# Patient Record
Sex: MALE | Race: WHITE | Employment: FULL TIME | URBAN - METROPOLITAN AREA
[De-identification: names, ages, dates, MRNs, and addresses within clinical notes are randomized per-mention and may not be internally consistent; named-entity substitution may affect disease eponyms.]

---

## 2017-01-06 ENCOUNTER — OFFICE VISIT (OUTPATIENT)
Dept: INTERNAL MEDICINE CLINIC | Age: 57
End: 2017-01-06

## 2017-01-06 VITALS
SYSTOLIC BLOOD PRESSURE: 133 MMHG | RESPIRATION RATE: 18 BRPM | OXYGEN SATURATION: 96 % | DIASTOLIC BLOOD PRESSURE: 89 MMHG | TEMPERATURE: 97.9 F | HEART RATE: 79 BPM | HEIGHT: 71 IN | BODY MASS INDEX: 28.7 KG/M2 | WEIGHT: 205 LBS

## 2017-01-06 DIAGNOSIS — Z12.11 SCREEN FOR COLON CANCER: ICD-10-CM

## 2017-01-06 DIAGNOSIS — R76.8 RHEUMATOID FACTOR POSITIVE: ICD-10-CM

## 2017-01-06 DIAGNOSIS — I10 ESSENTIAL HYPERTENSION: Primary | ICD-10-CM

## 2017-01-06 DIAGNOSIS — R73.9 HYPERGLYCEMIA: ICD-10-CM

## 2017-01-06 DIAGNOSIS — Z12.5 SCREENING FOR PROSTATE CANCER: ICD-10-CM

## 2017-01-06 DIAGNOSIS — Z23 ENCOUNTER FOR IMMUNIZATION: ICD-10-CM

## 2017-01-06 DIAGNOSIS — E78.00 HYPERCHOLESTEROLEMIA: ICD-10-CM

## 2017-01-06 NOTE — MR AVS SNAPSHOT
Visit Information Date & Time Provider Department Dept. Phone Encounter #  
 1/6/2017  8:45 AM Alfred Mccarthy, [address] [id number] Follow-up Instructions Return in about 6 months (around 7/6/2017) for HTN. Upcoming Health Maintenance Date Due COLONOSCOPY 7/17/1980 DTaP/Tdap/Td series (1 - Tdap) 10/2/2008 INFLUENZA AGE 9 TO ADULT 8/1/2016 Allergies as of 1/6/2017  Review Complete On: 1/6/2017 By: Alfred Mccarthy MD  
  
 Severity Noted Reaction Type Reactions Coricidin [Phenylephrine Hcl]  05/14/2013    Hives Losartan  06/29/2015    Other (comments) Arthralgias Current Immunizations  Reviewed on 5/14/2013 Name Date Hep A Vaccine 9/12/2011 Hep B Vaccine 9/12/2011 Influenza Vaccine 10/1/2015, 10/1/2014 Influenza Vaccine (Quad) PF  Incomplete Influenza Vaccine PF 10/11/2013 MMR 8/8/2010 Td 10/1/2008 Not reviewed this visit You Were Diagnosed With   
  
 Codes Comments Essential hypertension    -  Primary ICD-10-CM: I10 
ICD-9-CM: 401.9 Hypercholesterolemia     ICD-10-CM: E78.00 ICD-9-CM: 272.0 Rheumatoid factor positive     ICD-10-CM: R76.8 ICD-9-CM: 795.79 Screening for prostate cancer     ICD-10-CM: Z12.5 ICD-9-CM: V76.44 Hyperglycemia     ICD-10-CM: R73.9 ICD-9-CM: 790.29 Screen for colon cancer     ICD-10-CM: Z12.11 ICD-9-CM: V76.51 Encounter for immunization     ICD-10-CM: E64 ICD-9-CM: V03.89 Vitals BP Pulse Temp Resp Height(growth percentile) Weight(growth percentile) 133/89 (BP 1 Location: Left arm, BP Patient Position: Sitting) 79 97.9 °F (36.6 °C) (Oral) 18 5' 11\" (1.803 m) 205 lb (93 kg) SpO2 BMI Smoking Status 96% 28.59 kg/m2 Never Smoker Vitals History BMI and BSA Data Body Mass Index Body Surface Area 28.59 kg/m 2 2.16 m 2 Preferred Pharmacy Pharmacy Name Phone [address]. [phone] Your Updated Medication List  
  
   
This list is accurate as of: 1/6/17  9:29 AM.  Always use your most recent med list. amLODIPine 10 mg tablet Commonly known as:  Elspenidia Bakes Take 1 Tab by mouth daily. FISH OIL 1,000 mg Cap Generic drug:  omega-3 fatty acids-vitamin e Take 1 Cap by mouth.  
  
 multivitamin tablet Commonly known as:  ONE A DAY Take 1 Tab by mouth daily. NASACORT NA  
by Nasal route. ZYRTEC PO Take  by mouth. We Performed the Following CBC WITH AUTOMATED DIFF [45599 CPT(R)] HEMOGLOBIN A1C WITH EAG [64693 CPT(R)] INFLUENZA VIRUS VAC QUAD,SPLIT,PRESV FREE SYRINGE 3/> YRS IM [id number] CPT(R)] LIPID PANEL [25447 CPT(R)] METABOLIC PANEL, COMPREHENSIVE [75978 CPT(R)] PSA DIAGNOSTIC (PROSTATIC SPECIFIC AG) [id number] CPT(R)] REFERRAL TO GASTROENTEROLOGY [QIQ03 Custom] Follow-up Instructions Return in about 6 months (around 7/6/2017) for HTN. Referral Information Referral ID Referred By Referred To  
  
 [id number] Cristina RODRIGUES MD   
   [address] [place] [address] [address] Phone: [phone] Fax: [phone] Visits Status Start Date End Date 1 New Request 1/6/17 1/6/18 If your referral has a status of pending review or denied, additional information will be sent to support the outcome of this decision. Introducing [facility] & HEALTH SERVICES! Hieu Peralta introduces Big Super Search patient portal. Now you can access parts of your medical record, email your doctor's office, and request medication refills online. 1. In your internet browser, go to [email or web address] 2. Click on the First Time User? Click Here link in the Sign In box. You will see the New Member Sign Up page. 3. Enter your Big Super Search Access Code exactly as it appears below.  You will not need to use this code after youve completed the sign-up process. If you do not sign up before the expiration date, you must request a new code. · Profitably Access Code: [email or web address] Expires: 4/6/2017  9:29 AM 
 
4. Enter the last four digits of your Social Security Number (xxxx) and Date of Birth (mm/dd/yyyy) as indicated and click Submit. You will be taken to the next sign-up page. 5. Create a Profitably ID. This will be your Profitably login ID and cannot be changed, so think of one that is secure and easy to remember. 6. Create a Profitably password. You can change your password at any time. 7. Enter your Password Reset Question and Answer. This can be used at a later time if you forget your password. 8. Enter your e-mail address. You will receive e-mail notification when new information is available in [address]. 9. Click Sign Up. You can now view and download portions of your medical record. 10. Click the Download Summary menu link to download a portable copy of your medical information. If you have questions, please visit the Frequently Asked Questions section of the Profitably website. Remember, Profitably is NOT to be used for urgent needs. For medical emergencies, dial 911. Now available from your iPhone and Android! Please provide this summary of care documentation to your next provider. Your primary care clinician is listed as [place]. If you have any questions after today's visit, please call [phone].

## 2017-01-06 NOTE — PROGRESS NOTES
SUBJECTIVE:   Mr. Milena Pickering is a 49 y.o. male who is here for follow up of routine medical issues. Previously saw Dr. Brady Sierra. Chief Complaint   Patient presents with    Hypertension    Follow-up     6 month f.u    Other     pt refused flu shot    Back Pain     pt c/o lower back pain        Generally has BP runs 125-135 / 85. Allergies currently quiescent. He has a history of positive rheumatoid factor. He is starting to have a little joint pain and fatigue. Morning stiffness now noticed, lasting 45-60 min. As we noted in 2014: He recalls that when he was started on losartan, he developed a lot of joint pains, fatigue, and loss of exercise tolerance. \"I stopped taking it and the aches would subside. \" \"Now I'm not quite back to where I was before, but pretty near. \"   At this time, he is otherwise doing well and has brought no other complaints to my attention today. For a list of the medical issues addressed today, see the assessment and plan below. PMH:   Past Medical History   Diagnosis Date    Hx of seasonal allergies     Hypercholesterolemia 10/14/2013    Hypertension        Past Surgical History   Procedure Laterality Date    Hx orthopaedic  1989     Back Surgery     Hx wisdom teeth extraction         All: He is allergic to coricidin [phenylephrine hcl] and losartan. Current Outpatient Prescriptions   Medication Sig    amLODIPine (NORVASC) 10 mg tablet Take 1 Tab by mouth daily.  CETIRIZINE HCL (ZYRTEC PO) Take  by mouth.  multivitamin (ONE A DAY) tablet Take 1 Tab by mouth daily.  TRIAMCINOLONE ACETONIDE (NASACORT NA) by Nasal route.  omega-3 fatty acids-vitamin e (FISH OIL) 1,000 mg cap Take 1 Cap by mouth. No current facility-administered medications for this visit. FH: His family history includes Cancer in his father; Hypertension in his mother.   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 SH: He is an  with [employer]. He  reports that he has never smoked. He has never used smokeless tobacco. He reports that he drinks about 1.0 oz of alcohol per week  He reports that he does not use illicit drugs. ROS: See above; Complete ROS otherwise negative. OBJECTIVE:   Vitals:   Visit Vitals    /89 (BP 1 Location: Left arm, BP Patient Position: Sitting)    Pulse 79    Temp 97.9 °F (36.6 °C) (Oral)    Resp 18    Ht 5' 11\" (1.803 m)    Wt 205 lb (93 kg)    SpO2 96%    BMI 28.59 kg/m2      Gen: Pleasant 49 y.o.  male in NAD. HEENT: PERRLA. EOMI. OP moist and pink. Neck: Supple. No LAD. HEART: RRR, No M/G/R.    LUNGS: CTAB No W/R. ABDOMEN: S, NT, ND, BS+. EXTREMITIES: Warm. No C/C/E.  MUSCULOSKELETAL: Normal ROM, muscle strength 5/5 all groups. NEURO: Alert and oriented x 3. Cranial nerves grossly intact. No focal sensory or motor deficits noted. SKIN: Warm. Dry. No rashes or other lesions noted. Lab Results   Component Value Date/Time    Sodium 139 12/11/2015 10:16 AM    Potassium 4.8 12/11/2015 10:16 AM    Chloride 102 12/11/2015 10:16 AM    CO2 23 12/11/2015 10:16 AM    Glucose 98 12/11/2015 10:16 AM    BUN 15 12/11/2015 10:16 AM    Creatinine 1.38 12/11/2015 10:16 AM    BUN/Creatinine ratio 11 12/11/2015 10:16 AM    GFR est AA 67 12/11/2015 10:16 AM    GFR est non-AA 58 12/11/2015 10:16 AM    Calcium 9.6 12/11/2015 10:16 AM    Bilirubin, total 0.4 12/11/2015 10:16 AM    ALT 34 12/11/2015 10:16 AM    AST 24 12/11/2015 10:16 AM    Alk.  phosphatase 68 12/11/2015 10:16 AM    Protein, total 7.0 12/11/2015 10:16 AM    Albumin 4.3 12/11/2015 10:16 AM    A-G Ratio 1.6 12/11/2015 10:16 AM       Lab Results   Component Value Date/Time    Cholesterol, total 175 12/11/2015 10:16 AM    HDL Cholesterol 53 12/11/2015 10:16 AM    LDL, calculated 108 12/11/2015 10:16 AM    Triglyceride 69 12/11/2015 10:16 AM        Lab Results Component Value Date/Time    Hemoglobin A1c 5.9 06/13/2014 09:10 AM       Lab Results   Component Value Date/Time    WBC 5.3 12/11/2015 10:16 AM    HGB 16.9 12/11/2015 10:16 AM    HCT 49.0 12/11/2015 10:16 AM    PLATELET 287 72/12/3957 10:16 AM    MCV 90 12/11/2015 10:16 AM         ASSESSMENT/ PLAN: Mirtha Robins was seen today for follow up. 1. HTN (hypertension): High normal today. - METABOLIC PANEL, COMPREHENSIVE  - CBC WITH AUTOMATED DIFF  2. Hypercholesterolemia: Recheck. 3. Probable RA: Rheumatoid factor positive. Prev referred to Dr. Kamini Sage. 4. Allergic rhinitis: Improved. 5. Overweight: Continue work on diet and exericse. Follow-up Disposition:  Return in about 6 months (around 7/6/2017) for HTN. I have reviewed the patient's medications and risks/side effects/benefits were discussed. Diagnosis(-es) explained to patient and questions answered. Literature provided where appropriate.

## 2017-01-06 NOTE — PROGRESS NOTES
Immunization/s administered 1/6/2017 by Rich Coleman LPN with patient's consent. Patient tolerated procedure well. No reactions noted.

## 2017-01-06 NOTE — PROGRESS NOTES
1. Have you been to the ER, urgent care clinic since your last visit? Hospitalized since your last visit?no    2. Have you seen or consulted any other health care providers outside of the [address] [place] [address] since your last visit? Include any pap smears or colon screening.  no

## 2017-01-07 LAB
ALBUMIN SERPL-MCNC: 4.5 G/DL (ref 3.5–5.5)
ALBUMIN/GLOB SERPL: 1.6 {RATIO} (ref 1.1–2.5)
ALP SERPL-CCNC: 74 IU/L (ref 39–117)
ALT SERPL-CCNC: 26 IU/L (ref 0–44)
AST SERPL-CCNC: 23 IU/L (ref 0–40)
BASOPHILS # BLD AUTO: 0.1 X10E3/UL (ref 0–0.2)
BASOPHILS NFR BLD AUTO: 1 %
BILIRUB SERPL-MCNC: 0.4 MG/DL (ref 0–1.2)
BUN SERPL-MCNC: 13 MG/DL (ref 6–24)
BUN/CREAT SERPL: 10 (ref 9–20)
CALCIUM SERPL-MCNC: 9.6 MG/DL (ref 8.7–10.2)
CHLORIDE SERPL-SCNC: 102 MMOL/L (ref 96–106)
CHOLEST SERPL-MCNC: 178 MG/DL (ref 100–199)
CO2 SERPL-SCNC: 23 MMOL/L (ref 18–29)
CREAT SERPL-MCNC: 1.28 MG/DL (ref 0.76–1.27)
EOSINOPHIL # BLD AUTO: 0.3 X10E3/UL (ref 0–0.4)
EOSINOPHIL NFR BLD AUTO: 5 %
ERYTHROCYTE [DISTWIDTH] IN BLOOD BY AUTOMATED COUNT: 14 % (ref 12.3–15.4)
EST. AVERAGE GLUCOSE BLD GHB EST-MCNC: 120 MG/DL
GLOBULIN SER CALC-MCNC: 2.8 G/DL (ref 1.5–4.5)
GLUCOSE SERPL-MCNC: 99 MG/DL (ref 65–99)
HBA1C MFR BLD: 5.8 % (ref 4.8–5.6)
HCT VFR BLD AUTO: 50 % (ref 37.5–51)
HDLC SERPL-MCNC: 57 MG/DL
HGB BLD-MCNC: 17.2 G/DL (ref 12.6–17.7)
IMM GRANULOCYTES # BLD: 0 X10E3/UL (ref 0–0.1)
IMM GRANULOCYTES NFR BLD: 0 %
LDLC SERPL CALC-MCNC: 105 MG/DL (ref 0–99)
LYMPHOCYTES # BLD AUTO: 1.4 X10E3/UL (ref 0.7–3.1)
LYMPHOCYTES NFR BLD AUTO: 26 %
MCH RBC QN AUTO: 30.6 PG (ref 26.6–33)
MCHC RBC AUTO-ENTMCNC: 34.4 G/DL (ref 31.5–35.7)
MCV RBC AUTO: 89 FL (ref 79–97)
MONOCYTES # BLD AUTO: 0.7 X10E3/UL (ref 0.1–0.9)
MONOCYTES NFR BLD AUTO: 13 %
NEUTROPHILS # BLD AUTO: 3 X10E3/UL (ref 1.4–7)
NEUTROPHILS NFR BLD AUTO: 55 %
PLATELET # BLD AUTO: 223 X10E3/UL (ref 150–379)
POTASSIUM SERPL-SCNC: 4.7 MMOL/L (ref 3.5–5.2)
PROT SERPL-MCNC: 7.3 G/DL (ref 6–8.5)
PSA SERPL-MCNC: 0.6 NG/ML (ref 0–4)
RBC # BLD AUTO: 5.62 X10E6/UL (ref 4.14–5.8)
SODIUM SERPL-SCNC: 143 MMOL/L (ref 134–144)
TRIGL SERPL-MCNC: 82 MG/DL (ref 0–149)
VLDLC SERPL CALC-MCNC: 16 MG/DL (ref 5–40)
WBC # BLD AUTO: 5.5 X10E3/UL (ref 3.4–10.8)

## 2017-01-10 ENCOUNTER — TELEPHONE (OUTPATIENT)
Dept: INTERNAL MEDICINE CLINIC | Age: 57
End: 2017-01-10

## 2017-08-02 ENCOUNTER — OFFICE VISIT (OUTPATIENT)
Dept: INTERNAL MEDICINE CLINIC | Age: 57
End: 2017-08-02

## 2017-08-02 VITALS
HEIGHT: 71 IN | DIASTOLIC BLOOD PRESSURE: 76 MMHG | WEIGHT: 210 LBS | HEART RATE: 94 BPM | OXYGEN SATURATION: 94 % | TEMPERATURE: 98.3 F | BODY MASS INDEX: 29.4 KG/M2 | SYSTOLIC BLOOD PRESSURE: 123 MMHG | RESPIRATION RATE: 20 BRPM

## 2017-08-02 DIAGNOSIS — E78.00 HYPERCHOLESTEROLEMIA: ICD-10-CM

## 2017-08-02 DIAGNOSIS — I10 ESSENTIAL HYPERTENSION: ICD-10-CM

## 2017-08-02 DIAGNOSIS — Z12.11 SCREEN FOR COLON CANCER: ICD-10-CM

## 2017-08-02 DIAGNOSIS — R76.8 RHEUMATOID FACTOR POSITIVE: Primary | ICD-10-CM

## 2017-08-02 DIAGNOSIS — R73.9 BORDERLINE HYPERGLYCEMIA: ICD-10-CM

## 2017-08-02 RX ORDER — AMLODIPINE BESYLATE 10 MG/1
10 TABLET ORAL DAILY
Qty: 90 TAB | Refills: 3 | Status: SHIPPED | OUTPATIENT
Start: 2017-08-02 | End: 2018-08-06 | Stop reason: SDUPTHER

## 2017-08-02 NOTE — MR AVS SNAPSHOT
Visit Information Date & Time Provider Department Dept. Phone Encounter #  
 8/2/2017  3:30 PM Selvin Morris, [address] [place] [address],[address] [phone] [id number] Follow-up Instructions Return in about 6 months (around 2/2/2018) for HTN. Upcoming Health Maintenance Date Due COLONOSCOPY 7/17/1980 DTaP/Tdap/Td series (1 - Tdap) 10/2/2008 INFLUENZA AGE 9 TO ADULT 8/1/2017 Allergies as of 8/2/2017  Review Complete On: 8/2/2017 By: Selvin Morris MD  
  
 Severity Noted Reaction Type Reactions Coricidin [Phenylephrine Hcl]  05/14/2013    Hives Losartan  06/29/2015    Other (comments) Arthralgias Current Immunizations  Reviewed on 5/14/2013 Name Date Hep A Vaccine 9/12/2011 Hep B Vaccine 9/12/2011 Influenza Vaccine 10/1/2015, 10/1/2014 Influenza Vaccine (Quad) PF 1/6/2017 Influenza Vaccine PF 10/11/2013 MMR 8/8/2010 Td 10/1/2008 Not reviewed this visit You Were Diagnosed With   
  
 Codes Comments Rheumatoid factor positive    -  Primary ICD-10-CM: R76.8 ICD-9-CM: 795.79 Screen for colon cancer     ICD-10-CM: Z12.11 ICD-9-CM: V76.51 Essential hypertension     ICD-10-CM: I10 
ICD-9-CM: 401.9 Hypercholesterolemia     ICD-10-CM: E78.00 ICD-9-CM: 272.0 Borderline hyperglycemia     ICD-10-CM: R73.9 ICD-9-CM: 790.29 Vitals BP Pulse Temp Resp Height(growth percentile) Weight(growth percentile) 123/76 (BP 1 Location: Left arm, BP Patient Position: Sitting) 94 98.3 °F (36.8 °C) (Oral) 20 5' 11\" (1.803 m) 210 lb (95.3 kg) SpO2 BMI Smoking Status 94% 29.29 kg/m2 Never Smoker Vitals History BMI and BSA Data Body Mass Index Body Surface Area  
 29.29 kg/m 2 2.18 m 2 Preferred Pharmacy Pharmacy Name Phone [place] [address] [address]. [phone] Your Updated Medication List  
  
   
 This list is accurate as of: 8/2/17  4:27 PM.  Always use your most recent med list. amLODIPine 10 mg tablet Commonly known as:  Orval Kari Take 1 Tab by mouth daily. FISH OIL 1,000 mg Cap Generic drug:  omega-3 fatty acids-vitamin e Take 1 Cap by mouth.  
  
 multivitamin tablet Commonly known as:  ONE A DAY Take 1 Tab by mouth daily. NASACORT NA  
by Nasal route. ZYRTEC PO Take  by mouth. Prescriptions Printed Refills  
 amLODIPine (NORVASC) 10 mg tablet 3 Sig: Take 1 Tab by mouth daily. Class: Print Route: Oral  
  
We Performed the Following CBC WITH AUTOMATED DIFF [92920 CPT(R)] [address], IGG [id number] CPT(R)] HEMOGLOBIN A1C WITH EAG [42157 CPT(R)] LIPID PANEL [75985 CPT(R)] METABOLIC PANEL, COMPREHENSIVE [29410 CPT(R)] REFERRAL TO GASTROENTEROLOGY [AMD16 Custom] REFERRAL TO RHEUMATOLOGY [IAG62 Custom] RHEUMATOID FACTOR, QL [id number] CPT(R)] SED RATE (ESR) L571500 CPT(R)] Follow-up Instructions Return in about 6 months (around 2/2/2018) for HTN. Referral Information Referral ID Referred By Referred To  
  
 [id number] RAVI [address] Pamela Núñez MD   
   [address] [facility], [address] Phone: [phone] Fax: [phone] Visits Status Start Date End Date 1 New Request 8/2/17 8/2/18 If your referral has a status of pending review or denied, additional information will be sent to support the outcome of this decision. Referral ID Referred By Referred To  
 [id number] [facility] 200 [facility]  
   305 [facility] 230 [facility] 200 [facility] Visits Status Start Date End Date 1 New Request 8/2/17 8/2/18 If your referral has a status of pending review or denied, additional information will be sent to support the outcome of this decision. Patient Instructions Learning About Cutting Calories How do calories affect your weight? Food gives your body energy. Energy from the food you eat is measured in calories. This energy keeps your heart beating, your brain active, and your muscles working. Your body needs a certain number of calories each day. After your body uses the calories it needs, it stores extra calories as fat. To lose weight safely, you have to eat fewer calories while eating in a healthy way. How many calories do you need each day? The more active you are, the more calories you need. When you are less active, you need fewer calories. How many calories you need each day also depends on several things, including your age and whether you are male or female. Here are some general guidelines for adults: 
· Less active women and older adults need 1,600 to 2,000 calories each day. · Active women and less active men need 2,000 to 2,400 calories each day. · Active men need 2,400 to 3,000 calories each day. How can you cut calories and eat healthy meals? Whole grains, vegetables and fruits, and dried beans are good lower-calorie foods. They give you lots of nutrients and fiber. And they fill you up. Sweets, energy drinks, and soda pop are high in calories. They give you few nutrients and no fiber. Try to limit soda pop, fruit juice, and energy drinks. Drink water instead. Some fats can be part of a healthy diet. But cutting back on fats from highly processed foods like fast foods and many snack foods is a good way to lower the calories in your diet. Also, use smaller amounts of fats like butter, margarine, salad dressing, and mayonnaise. Add fresh garlic, lemon, or herbs to your meals to add flavor without adding fat. Meats and dairy products can be a big source of hidden fats. Try to choose lean or low-fat versions of these products.  
Fat-free cookies, candies, chips, and frozen treats can still be high in sugar and calories. Some fat-free foods have more calories than regular ones. Eat fat-free treats in moderation, as you would other foods. If your favorite foods are high in fat, salt, sugar, or calories, limit how often you eat them. Eat smaller servings, or look for healthy substitutes. Fill up on fruits, vegetables, and whole grains. Eating at home · Use meat as a side dish instead of as the main part of your meal. 
· Try main dishes that use whole wheat pasta, brown rice, dried beans, or vegetables. · Find ways to cook with little or no fat, such as broiling, steaming, or grilling. · Use cooking spray instead of oil. If you use oil, use a monounsaturated oil, such as canola or olive oil. · Trim fat from meats before you cook them. · Drain off fat after you brown the meat or while you roast it. · Chill soups and stews after you cook them. Then skim the fat off the top after it hardens. Eating out · Order foods that are broiled or poached rather than fried or breaded. · Cut back on the amount of butter or margarine that you use on bread. · Order sauces, gravies, and salad dressings on the side, and use only a little. · When you order pasta, choose tomato sauce rather than cream sauce. · Ask for salsa with your baked potato instead of sour cream, butter, cheese, or hercules. · Order meals in a small size instead of upgrading to a large. · Share an entree, or take part of your food home to eat as another meal. 
· Share appetizers and desserts. Where can you learn more? Go to http://jessica-ro.info/. Enter 99 [id number] in the search box to learn more about \"Learning About Cutting Calories. \" Current as of: November 9, 2016 Content Version: 11.3 © 6312-9903 [facility]. Care instructions adapted under license by [employer] (which disclaims liability or warranty for this information).  If you have questions about a medical condition or this instruction, always ask your healthcare professional. [place] [address] any warranty or liability for your use of this information. Introducing hospitals & HEALTH SERVICES! Dennis Morton introduces BrandProject patient portal. Now you can access parts of your medical record, email your doctor's office, and request medication refills online. 1. In your internet browser, go to [email or web address] 2. Click on the First Time User? Click Here link in the Sign In box. You will see the New Member Sign Up page. 3. Enter your BrandProject Access Code exactly as it appears below. You will not need to use this code after youve completed the sign-up process. If you do not sign up before the expiration date, you must request a new code. · BrandProject Access Code: [email or web address] Expires: 10/31/2017  4:11 PM 
 
4. Enter the last four digits of your Social Security Number (xxxx) and Date of Birth (mm/dd/yyyy) as indicated and click Submit. You will be taken to the next sign-up page. 5. Create a BrandProject ID. This will be your BrandProject login ID and cannot be changed, so think of one that is secure and easy to remember. 6. Create a BrandProject password. You can change your password at any time. 7. Enter your Password Reset Question and Answer. This can be used at a later time if you forget your password. 8. Enter your e-mail address. You will receive e-mail notification when new information is available in [address] 9. Click Sign Up. You can now view and download portions of your medical record. 10. Click the Download Summary menu link to download a portable copy of your medical information. If you have questions, please visit the Frequently Asked Questions section of the BrandProject website. Remember, BrandProject is NOT to be used for urgent needs. For medical emergencies, dial 911. Now available from your iPhone and Android! Please provide this summary of care documentation to your next provider. Your primary care clinician is listed as [place]. If you have any questions after today's visit, please call [phone].

## 2017-08-02 NOTE — PROGRESS NOTES
SUBJECTIVE:   Mr. Jack Patrick is a 56 y.o. male who is here for follow up of routine medical issues. Previously saw Dr. Sloan Guardado. 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 \"My arthritis is worse. Christiano in the morning. \"  Some burning numb feelings in hands. He has a history of positive rheumatoid factor. He is starting to have a little joint pain and fatigue. Morning stiffness now noticed, lasting 45-60 min. As we noted in 2014: He recalls that when he was started on losartan, he developed a lot of joint pains, fatigue, and loss of exercise tolerance. \"I stopped taking it and the aches would subside. \" \"Now I'm not quite back to where I was before, but pretty near. \"   Generally has BP runs 125-135 / 85. Allergies currently quiescent. At this time, he is otherwise doing well and has brought no other complaints to my attention today. For a list of the medical issues addressed today, see the assessment and plan below. PMH:   Past Medical History:   Diagnosis Date    Hx of seasonal allergies     Hypercholesterolemia 10/14/2013    Hypertension        Past Surgical History:   Procedure Laterality Date    HX ORTHOPAEDIC  1989    Back Surgery     HX WISDOM TEETH EXTRACTION         All: He is allergic to coricidin [phenylephrine hcl] and losartan. Current Outpatient Prescriptions   Medication Sig    amLODIPine (NORVASC) 10 mg tablet Take 1 Tab by mouth daily.  CETIRIZINE HCL (ZYRTEC PO) Take  by mouth.  multivitamin (ONE A DAY) tablet Take 1 Tab by mouth daily.  TRIAMCINOLONE ACETONIDE (NASACORT NA) by Nasal route.  omega-3 fatty acids-vitamin e (FISH OIL) 1,000 mg cap Take 1 Cap by mouth. No current facility-administered medications for this visit. FH: His family history includes Cancer in his father; Hypertension in his mother.   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 SH: He is an  with [address] He  reports that he has never smoked. He has never used smokeless tobacco. He reports that he drinks about 1.0 oz of alcohol per week  He reports that he does not use illicit drugs. ROS: See above; Complete ROS otherwise negative. OBJECTIVE:   Vitals:   Visit Vitals    /76 (BP 1 Location: Left arm, BP Patient Position: Sitting)    Pulse 94    Temp 98.3 °F (36.8 °C) (Oral)    Resp 20    Ht 5' 11\" (1.803 m)    Wt 210 lb (95.3 kg)    SpO2 94%    BMI 29.29 kg/m2      Gen: Pleasant 56 y.o.  male in NAD. HEENT: PERRLA. EOMI. OP moist and pink. Neck: Supple. No LAD. HEART: RRR, No M/G/R.    LUNGS: CTAB No W/R. ABDOMEN: S, NT, ND, BS+. EXTREMITIES: Warm. No C/C/E.  MUSCULOSKELETAL: Normal ROM, muscle strength 5/5 all groups. NEURO: Alert and oriented x 3. Cranial nerves grossly intact. No focal sensory or motor deficits noted. SKIN: Warm. Dry. No rashes or other lesions noted. Lab Results   Component Value Date/Time    Sodium 143 01/06/2017 09:52 AM    Potassium 4.7 01/06/2017 09:52 AM    Chloride 102 01/06/2017 09:52 AM    CO2 23 01/06/2017 09:52 AM    Glucose 99 01/06/2017 09:52 AM    BUN 13 01/06/2017 09:52 AM    Creatinine 1.28 01/06/2017 09:52 AM    BUN/Creatinine ratio 10 01/06/2017 09:52 AM    GFR est AA 73 01/06/2017 09:52 AM    GFR est non-AA 63 01/06/2017 09:52 AM    Calcium 9.6 01/06/2017 09:52 AM    Bilirubin, total 0.4 01/06/2017 09:52 AM    ALT (SGPT) 26 01/06/2017 09:52 AM    AST (SGOT) 23 01/06/2017 09:52 AM    Alk.  phosphatase 74 01/06/2017 09:52 AM    Protein, total 7.3 01/06/2017 09:52 AM    Albumin 4.5 01/06/2017 09:52 AM    A-G Ratio 1.6 01/06/2017 09:52 AM       Lab Results   Component Value Date/Time    Cholesterol, total 178 01/06/2017 09:52 AM    HDL Cholesterol 57 01/06/2017 09:52 AM    LDL, calculated 105 01/06/2017 09:52 AM    Triglyceride 82 01/06/2017 09:52 AM        Lab Results   Component Value Date/Time    Hemoglobin A1c 5.8 01/06/2017 09:52 AM       Lab Results   Component Value Date/Time    WBC 5.5 01/06/2017 09:52 AM    HGB 17.2 01/06/2017 09:52 AM    HCT 50.0 01/06/2017 09:52 AM    PLATELET 609 85/57/8720 09:52 AM    MCV 89 01/06/2017 09:52 AM         ASSESSMENT/ PLAN: Gustavo Iglesias was seen today for follow up. 1. HTN (hypertension): High normal today. - METABOLIC PANEL, COMPREHENSIVE  - CBC WITH AUTOMATED DIFF  2. Hypercholesterolemia: Recheck. 3. Probable RA: Rheumatoid factor positive. Prev referred to Dr. Nickie Black. 4. Allergic rhinitis: Improved. 5. Overweight: Continue work on diet and exericse. 6. Borderline hyperglycemia: Check A1C. Follow-up Disposition:  Return in about 6 months (around 2/2/2018) for HTN. I have reviewed the patient's medications and risks/side effects/benefits were discussed. Diagnosis(-es) explained to patient and questions answered. Literature provided where appropriate.

## 2017-08-02 NOTE — PATIENT INSTRUCTIONS

## 2017-08-02 NOTE — PROGRESS NOTES
1. Have you been to the ER, urgent care clinic since your last visit? Hospitalized since your last visit?no    2. Have you seen or consulted any other health care providers outside of the [address] [place] [address] since your last visit? Include any pap smears or colon screening.  no

## 2017-08-24 ENCOUNTER — APPOINTMENT (OUTPATIENT)
Dept: INTERNAL MEDICINE CLINIC | Age: 57
End: 2017-08-24

## 2017-08-25 LAB
ALBUMIN SERPL-MCNC: 4 G/DL (ref 3.5–5.5)
ALBUMIN/GLOB SERPL: 1.5 {RATIO} (ref 1.2–2.2)
ALP SERPL-CCNC: 65 IU/L (ref 39–117)
ALT SERPL-CCNC: 24 IU/L (ref 0–44)
AST SERPL-CCNC: 18 IU/L (ref 0–40)
BASOPHILS # BLD AUTO: 0.1 X10E3/UL (ref 0–0.2)
BASOPHILS NFR BLD AUTO: 1 %
BILIRUB SERPL-MCNC: 0.5 MG/DL (ref 0–1.2)
BUN SERPL-MCNC: 13 MG/DL (ref 6–24)
BUN/CREAT SERPL: 12 (ref 9–20)
CALCIUM SERPL-MCNC: 9.1 MG/DL (ref 8.7–10.2)
CCP IGA+IGG SERPL IA-ACNC: 9 UNITS (ref 0–19)
CHLORIDE SERPL-SCNC: 105 MMOL/L (ref 96–106)
CHOLEST SERPL-MCNC: 161 MG/DL (ref 100–199)
CO2 SERPL-SCNC: 24 MMOL/L (ref 18–29)
CREAT SERPL-MCNC: 1.11 MG/DL (ref 0.76–1.27)
EOSINOPHIL # BLD AUTO: 0.3 X10E3/UL (ref 0–0.4)
EOSINOPHIL NFR BLD AUTO: 5 %
ERYTHROCYTE [DISTWIDTH] IN BLOOD BY AUTOMATED COUNT: 14.2 % (ref 12.3–15.4)
ERYTHROCYTE [SEDIMENTATION RATE] IN BLOOD BY WESTERGREN METHOD: 3 MM/HR (ref 0–30)
EST. AVERAGE GLUCOSE BLD GHB EST-MCNC: 120 MG/DL
GLOBULIN SER CALC-MCNC: 2.6 G/DL (ref 1.5–4.5)
GLUCOSE SERPL-MCNC: 95 MG/DL (ref 65–99)
HBA1C MFR BLD: 5.8 % (ref 4.8–5.6)
HCT VFR BLD AUTO: 48.7 % (ref 37.5–51)
HDLC SERPL-MCNC: 50 MG/DL
HGB BLD-MCNC: 16.2 G/DL (ref 12.6–17.7)
IMM GRANULOCYTES # BLD: 0 X10E3/UL (ref 0–0.1)
IMM GRANULOCYTES NFR BLD: 0 %
LDLC SERPL CALC-MCNC: 94 MG/DL (ref 0–99)
LYMPHOCYTES # BLD AUTO: 1.4 X10E3/UL (ref 0.7–3.1)
LYMPHOCYTES NFR BLD AUTO: 29 %
MCH RBC QN AUTO: 30.5 PG (ref 26.6–33)
MCHC RBC AUTO-ENTMCNC: 33.3 G/DL (ref 31.5–35.7)
MCV RBC AUTO: 92 FL (ref 79–97)
MONOCYTES # BLD AUTO: 0.6 X10E3/UL (ref 0.1–0.9)
MONOCYTES NFR BLD AUTO: 13 %
NEUTROPHILS # BLD AUTO: 2.5 X10E3/UL (ref 1.4–7)
NEUTROPHILS NFR BLD AUTO: 52 %
PLATELET # BLD AUTO: 233 X10E3/UL (ref 150–379)
POTASSIUM SERPL-SCNC: 4.2 MMOL/L (ref 3.5–5.2)
PROT SERPL-MCNC: 6.6 G/DL (ref 6–8.5)
RBC # BLD AUTO: 5.31 X10E6/UL (ref 4.14–5.8)
SODIUM SERPL-SCNC: 143 MMOL/L (ref 134–144)
TRIGL SERPL-MCNC: 84 MG/DL (ref 0–149)
VLDLC SERPL CALC-MCNC: 17 MG/DL (ref 5–40)
WBC # BLD AUTO: 4.9 X10E3/UL (ref 3.4–10.8)

## 2017-09-06 ENCOUNTER — TELEPHONE (OUTPATIENT)
Dept: INTERNAL MEDICINE CLINIC | Age: 57
End: 2017-09-06

## 2018-02-05 ENCOUNTER — OFFICE VISIT (OUTPATIENT)
Dept: INTERNAL MEDICINE CLINIC | Age: 58
End: 2018-02-05

## 2018-02-05 VITALS
HEART RATE: 84 BPM | DIASTOLIC BLOOD PRESSURE: 103 MMHG | TEMPERATURE: 97.4 F | SYSTOLIC BLOOD PRESSURE: 146 MMHG | OXYGEN SATURATION: 96 % | HEIGHT: 71 IN | BODY MASS INDEX: 29.4 KG/M2 | RESPIRATION RATE: 16 BRPM | WEIGHT: 210 LBS

## 2018-02-05 DIAGNOSIS — R76.8 RHEUMATOID FACTOR POSITIVE: ICD-10-CM

## 2018-02-05 DIAGNOSIS — R73.9 BORDERLINE HYPERGLYCEMIA: ICD-10-CM

## 2018-02-05 DIAGNOSIS — I10 ESSENTIAL HYPERTENSION: Primary | ICD-10-CM

## 2018-02-05 DIAGNOSIS — E78.00 HYPERCHOLESTEROLEMIA: ICD-10-CM

## 2018-02-05 NOTE — MR AVS SNAPSHOT
[address] [facility] 
[phone] Patient: Brandon Catherine MRN: [id number] Visit Information Date & Time Provider Department Dept. Phone Encounter #  
 2/5/2018  3:30 PM Néstor Almaguer, [address] [place] [address],[address] [phone] [id number] Follow-up Instructions Return in about 6 weeks (around 3/19/2018) for HTN; also 6 month HTN. Follow-up and Disposition History Upcoming Health Maintenance Date Due COLONOSCOPY 7/17/1980 DTaP/Tdap/Td series (1 - Tdap) 10/2/2008 Allergies as of 2/5/2018  Review Complete On: 2/5/2018 By: Néstor Almaguer MD  
  
 Severity Noted Reaction Type Reactions Coricidin [Phenylephrine Hcl]  05/14/2013    Hives Losartan  06/29/2015    Other (comments) Arthralgias Current Immunizations  Reviewed on 5/14/2013 Name Date Hep A Vaccine 9/12/2011 Hep B Vaccine 9/12/2011 Influenza Vaccine 10/1/2015, 10/1/2014 Influenza Vaccine (Quad) PF 1/6/2017 Influenza Vaccine PF 10/11/2013 MMR 8/8/2010 Td 10/1/2008 Not reviewed this visit You Were Diagnosed With   
  
 Codes Comments Essential hypertension    -  Primary ICD-10-CM: I10 
ICD-9-CM: 401.9 Hypercholesterolemia     ICD-10-CM: E78.00 ICD-9-CM: 272.0 Rheumatoid factor positive     ICD-10-CM: R76.8 ICD-9-CM: 795.79 Borderline hyperglycemia     ICD-10-CM: R73.9 ICD-9-CM: 790.29 Vitals BP Pulse Temp Resp Height(growth percentile) Weight(growth percentile) (!) 146/103 84 97.4 °F (36.3 °C) (Oral) 16 5' 11\" (1.803 m) 210 lb (95.3 kg) SpO2 BMI Smoking Status 96% 29.29 kg/m2 Never Smoker Vitals History BMI and BSA Data Body Mass Index Body Surface Area  
 29.29 kg/m 2 2.18 m 2 Preferred Pharmacy Pharmacy Name Phone [facility] [address] [phone] Your Updated Medication List  
  
   
This list is accurate as of: 2/5/18  4:42 PM.  Always use your most recent med list. amLODIPine 10 mg tablet Commonly known as:  Angélica Turpin Take 1 Tab by mouth daily. FISH OIL 1,000 mg Cap Generic drug:  omega-3 fatty acids-vitamin e Take 1 Cap by mouth.  
  
 multivitamin tablet Commonly known as:  ONE A DAY Take 1 Tab by mouth daily. NASACORT NA  
by Nasal route. ZYRTEC PO Take  by mouth. We Performed the Following CBC WITH AUTOMATED DIFF [37911 CPT(R)] HEMOGLOBIN A1C WITH EAG [37385 CPT(R)] LIPID PANEL [67748 CPT(R)] METABOLIC PANEL, COMPREHENSIVE [64134 CPT(R)] Follow-up Instructions Return in about 6 weeks (around 3/19/2018) for HTN; also 6 month HTN. Patient Instructions Body Mass Index: Care Instructions Your Care Instructions Body mass index (BMI) can help you see if your weight is raising your risk for health problems. It uses a formula to compare how much you weigh with how tall you are. · A BMI lower than 18.5 is considered underweight. · A BMI between 18.5 and 24.9 is considered healthy. · A BMI between 25 and 29.9 is considered overweight. A BMI of 30 or higher is considered obese. If your BMI is in the normal range, it means that you have a lower risk for weight-related health problems. If your BMI is in the overweight or obese range, you may be at increased risk for weight-related health problems, such as high blood pressure, heart disease, stroke, arthritis or joint pain, and diabetes. If your BMI is in the underweight range, you may be at increased risk for health problems such as fatigue, lower protection (immunity) against illness, muscle loss, bone loss, hair loss, and hormone problems. BMI is just one measure of your risk for weight-related health problems.  You may be at higher risk for health problems if you are not active, you eat an unhealthy diet, or you drink too much alcohol or use tobacco products. Follow-up care is a key part of your treatment and safety. Be sure to make and go to all appointments, and call your doctor if you are having problems. It's also a good idea to know your test results and keep a list of the medicines you take. How can you care for yourself at home? · Practice healthy eating habits. This includes eating plenty of fruits, vegetables, whole grains, lean protein, and low-fat dairy. · If your doctor recommends it, get more exercise. Walking is a good choice. Bit by bit, increase the amount you walk every day. Try for at least 30 minutes on most days of the week. · Do not smoke. Smoking can increase your risk for health problems. If you need help quitting, talk to your doctor about stop-smoking programs and medicines. These can increase your chances of quitting for good. · Limit alcohol to 2 drinks a day for men and 1 drink a day for women. Too much alcohol can cause health problems. If you have a BMI higher than 25 · Your doctor may do other tests to check your risk for weight-related health problems. This may include measuring the distance around your waist. A waist measurement of more than 40 inches in men or 35 inches in women can increase the risk of weight-related health problems. · Talk with your doctor about steps you can take to stay healthy or improve your health. You may need to make lifestyle changes to lose weight and stay healthy, such as changing your diet and getting regular exercise. If you have a BMI lower than 18.5 · Your doctor may do other tests to check your risk for health problems. · Talk with your doctor about steps you can take to stay healthy or improve your health. You may need to make lifestyle changes to gain or maintain weight and stay healthy, such as getting more healthy foods in your diet and doing exercises to build muscle. Where can you learn more? Go to http://jessica-ro.info/. Enter S176 in the search box to learn more about \"Body Mass Index: Care Instructions. \" Current as of: October 13, 2016 Content Version: 11.4 © 0755-2115 Healthwise, Incorporated. Care instructions adapted under license by [employer] (which disclaims liability or warranty for this information). If you have questions about a medical condition or this instruction, always ask your healthcare professional. [place] [address] any warranty or liability for your use of this information. Introducing [facility]! [facility] introduces [employer] patient portal. Now you can access parts of your medical record, email your doctor's office, and request medication refills online. 1. In your internet browser, go to [email or web address] 2. Click on the First Time User? Click Here link in the Sign In box. You will see the New Member Sign Up page. 3. Enter your [employer] Access Code exactly as it appears below. You will not need to use this code after youve completed the sign-up process. If you do not sign up before the expiration date, you must request a new code. · [employer] Access Code: [email or web address] Expires: 5/6/2018  4:30 PM 
 
4. Enter the last four digits of your Social Security Number (xxxx) and Date of Birth (mm/dd/yyyy) as indicated and click Submit. You will be taken to the next sign-up page. 5. Create a [employer] ID. This will be your [employer] login ID and cannot be changed, so think of one that is secure and easy to remember. 6. Create a [employer] password. You can change your password at any time. 7. Enter your Password Reset Question and Answer. This can be used at a later time if you forget your password. 8. Enter your e-mail address. You will receive e-mail notification when new information is available in [address] 9. Click Sign Up.  You can now view and download portions of your medical record. 10. Click the Download Summary menu link to download a portable copy of your medical information. If you have questions, please visit the Frequently Asked Questions section of the [employer] website. Remember, [employer] is NOT to be used for urgent needs. For medical emergencies, dial 911. Now available from your iPhone and Android! Please provide this summary of care documentation to your next provider. Your primary care clinician is listed as [place]. If you have any questions after today's visit, please call [phone].

## 2018-02-05 NOTE — PROGRESS NOTES
SUBJECTIVE:   Mr. Jim Ramirez is a 56 y.o. male who is here for follow up of routine medical issues. Previously saw Dr. Didi Zaidi. Chief Complaint   Patient presents with    Hypertension     6 mo. f/u    Cholesterol Problem       [facility]; getting ready to go back to [place]. \"I'm a bit jet-lagged. \"   BP 140s/90s at home  \"My arthritis is worse. Christiano in the morning. \"  Some burning numb feelings in hands. He has a history of positive rheumatoid factor. He is starting to have a little joint pain and fatigue. Morning stiffness now noticed, lasting 45-60 min. As we noted in 2014: He recalls that when he was started on losartan, he developed a lot of joint pains, fatigue, and loss of exercise tolerance. \"I stopped taking it and the aches would subside. \" \"Now I'm not quite back to where I was before, but pretty near. \"   Allergies currently quiescent. At this time, he is otherwise doing well and has brought no other complaints to my attention today. For a list of the medical issues addressed today, see the assessment and plan below. PMH:   Past Medical History:   Diagnosis Date    Hx of seasonal allergies     Hypercholesterolemia 10/14/2013    Hypertension        Past Surgical History:   Procedure Laterality Date    HX ORTHOPAEDIC  1989    Back Surgery     HX WISDOM TEETH EXTRACTION         All: He is allergic to coricidin [phenylephrine hcl] and losartan. Current Outpatient Prescriptions   Medication Sig    amLODIPine (NORVASC) 10 mg tablet Take 1 Tab by mouth daily.  CETIRIZINE HCL (ZYRTEC PO) Take  by mouth.  TRIAMCINOLONE ACETONIDE (NASACORT NA) by Nasal route.  omega-3 fatty acids-vitamin e (FISH OIL) 1,000 mg cap Take 1 Cap by mouth.  multivitamin (ONE A DAY) tablet Take 1 Tab by mouth daily. No current facility-administered medications for this visit. FH: His family history includes Cancer in his father; Hypertension in his mother. 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 SH: He is an  with [address] He  reports that he has never smoked. He has never used smokeless tobacco. He reports that he drinks about 1.0 oz of alcohol per week  He reports that he does not use illicit drugs. ROS: See above; Complete ROS otherwise negative. OBJECTIVE:   Vitals:   Visit Vitals    BP (!) 146/103    Pulse 84    Temp 97.4 °F (36.3 °C) (Oral)    Resp 16    Ht 5' 11\" (1.803 m)    Wt 210 lb (95.3 kg)    SpO2 96%    BMI 29.29 kg/m2      Gen: Pleasant 56 y.o.  male in NAD. HEENT: PERRLA. EOMI. OP moist and pink. Neck: Supple. No LAD. HEART: RRR, No M/G/R.    LUNGS: CTAB No W/R. ABDOMEN: S, NT, ND, BS+. EXTREMITIES: Warm. No C/C/E.  MUSCULOSKELETAL: Normal ROM, muscle strength 5/5 all groups. NEURO: Alert and oriented x 3. Cranial nerves grossly intact. No focal sensory or motor deficits noted. SKIN: Warm. Dry. No rashes or other lesions noted. Lab Results   Component Value Date/Time    Sodium 143 08/24/2017 08:48 AM    Potassium 4.2 08/24/2017 08:48 AM    Chloride 105 08/24/2017 08:48 AM    CO2 24 08/24/2017 08:48 AM    Glucose 95 08/24/2017 08:48 AM    BUN 13 08/24/2017 08:48 AM    Creatinine 1.11 08/24/2017 08:48 AM    BUN/Creatinine ratio 12 08/24/2017 08:48 AM    GFR est AA 86 08/24/2017 08:48 AM    GFR est non-AA 74 08/24/2017 08:48 AM    Calcium 9.1 08/24/2017 08:48 AM    Bilirubin, total 0.5 08/24/2017 08:48 AM    ALT (SGPT) 24 08/24/2017 08:48 AM    AST (SGOT) 18 08/24/2017 08:48 AM    Alk.  phosphatase 65 08/24/2017 08:48 AM    Protein, total 6.6 08/24/2017 08:48 AM    Albumin 4.0 08/24/2017 08:48 AM    A-G Ratio 1.5 08/24/2017 08:48 AM       Lab Results   Component Value Date/Time    Cholesterol, total 161 08/24/2017 08:48 AM    HDL Cholesterol 50 08/24/2017 08:48 AM    LDL, calculated 94 08/24/2017 08:48 AM Triglyceride 84 08/24/2017 08:48 AM        Lab Results   Component Value Date/Time    Hemoglobin A1c 5.8 08/24/2017 08:48 AM       Lab Results   Component Value Date/Time    WBC 4.9 08/24/2017 08:48 AM    HGB 16.2 08/24/2017 08:48 AM    HCT 48.7 08/24/2017 08:48 AM    PLATELET 619 54/11/8776 08:48 AM    MCV 92 08/24/2017 08:48 AM         ASSESSMENT/ PLAN: Tonya Mcfadden was seen today for follow up. 1. HTN (hypertension): High today. Recheck soon; Consider adding HCTZ.   - METABOLIC PANEL, COMPREHENSIVE  - CBC WITH AUTOMATED DIFF  2. Hypercholesterolemia: Recheck. 3. Probable RA: Rheumatoid factor positive. Prev referred to Dr. Daquan Bonilla. 4. Allergic rhinitis: Improved. 5. Overweight: Continue work on diet and exericse. 6. Borderline hyperglycemia: Check A1C.   7. Colon Ca screening; previously referred to Dr. Homer Parikh; reminded him of this. Follow-up Disposition:  Return in about 6 weeks (around 3/19/2018) for HTN; also 6 month HTN. I have reviewed the patient's medications and risks/side effects/benefits were discussed. Diagnosis(-es) explained to patient and questions answered. Literature provided where appropriate. Discussed the patient's BMI with him. The BMI follow up plan is as follows:     dietary management education, guidance, and counseling  encourage exercise  monitor weight  prescribed dietary intake    An After Visit Summary was printed and given to the patient.

## 2018-02-05 NOTE — PATIENT INSTRUCTIONS
Body Mass Index: Care Instructions  Your Care Instructions    Body mass index (BMI) can help you see if your weight is raising your risk for health problems. It uses a formula to compare how much you weigh with how tall you are. · A BMI lower than 18.5 is considered underweight. · A BMI between 18.5 and 24.9 is considered healthy. · A BMI between 25 and 29.9 is considered overweight. A BMI of 30 or higher is considered obese. If your BMI is in the normal range, it means that you have a lower risk for weight-related health problems. If your BMI is in the overweight or obese range, you may be at increased risk for weight-related health problems, such as high blood pressure, heart disease, stroke, arthritis or joint pain, and diabetes. If your BMI is in the underweight range, you may be at increased risk for health problems such as fatigue, lower protection (immunity) against illness, muscle loss, bone loss, hair loss, and hormone problems. BMI is just one measure of your risk for weight-related health problems. You may be at higher risk for health problems if you are not active, you eat an unhealthy diet, or you drink too much alcohol or use tobacco products. Follow-up care is a key part of your treatment and safety. Be sure to make and go to all appointments, and call your doctor if you are having problems. It's also a good idea to know your test results and keep a list of the medicines you take. How can you care for yourself at home? · Practice healthy eating habits. This includes eating plenty of fruits, vegetables, whole grains, lean protein, and low-fat dairy. · If your doctor recommends it, get more exercise. Walking is a good choice. Bit by bit, increase the amount you walk every day. Try for at least 30 minutes on most days of the week. · Do not smoke. Smoking can increase your risk for health problems. If you need help quitting, talk to your doctor about stop-smoking programs and medicines. These can increase your chances of quitting for good. · Limit alcohol to 2 drinks a day for men and 1 drink a day for women. Too much alcohol can cause health problems. If you have a BMI higher than 25  · Your doctor may do other tests to check your risk for weight-related health problems. This may include measuring the distance around your waist. A waist measurement of more than 40 inches in men or 35 inches in women can increase the risk of weight-related health problems. · Talk with your doctor about steps you can take to stay healthy or improve your health. You may need to make lifestyle changes to lose weight and stay healthy, such as changing your diet and getting regular exercise. If you have a BMI lower than 18.5  · Your doctor may do other tests to check your risk for health problems. · Talk with your doctor about steps you can take to stay healthy or improve your health. You may need to make lifestyle changes to gain or maintain weight and stay healthy, such as getting more healthy foods in your diet and doing exercises to build muscle. Where can you learn more? Go to http://jessica-ro.info/. Enter S176 in the search box to learn more about \"Body Mass Index: Care Instructions. \"  Current as of: October 13, 2016  Content Version: 11.4  © 1172-8564 Healthwise, Incorporated. Care instructions adapted under license by [employer] (which disclaims liability or warranty for this information). If you have questions about a medical condition or this instruction, always ask your healthcare professional. [address] any warranty or liability for your use of this information.

## 2018-02-05 NOTE — PROGRESS NOTES
Chief Complaint   Patient presents with    Hypertension     6 mo. f/u    Cholesterol Problem       1. Have you been to the ER, urgent care clinic since your last visit? Hospitalized since your last visit? no    2. Have you seen or consulted any other health care providers outside of the [address] [place] [address] since your last visit?   Include any pap smears or colon screening no

## 2018-04-04 ENCOUNTER — APPOINTMENT (OUTPATIENT)
Dept: INTERNAL MEDICINE CLINIC | Age: 58
End: 2018-04-04

## 2018-04-05 LAB
ALBUMIN SERPL-MCNC: 4 G/DL (ref 3.5–5.5)
ALBUMIN/GLOB SERPL: 1.4 {RATIO} (ref 1.2–2.2)
ALP SERPL-CCNC: 62 IU/L (ref 39–117)
ALT SERPL-CCNC: 35 IU/L (ref 0–44)
AST SERPL-CCNC: 23 IU/L (ref 0–40)
BASOPHILS # BLD AUTO: 0.1 X10E3/UL (ref 0–0.2)
BASOPHILS NFR BLD AUTO: 1 %
BILIRUB SERPL-MCNC: 0.5 MG/DL (ref 0–1.2)
BUN SERPL-MCNC: 18 MG/DL (ref 6–24)
BUN/CREAT SERPL: 14 (ref 9–20)
CALCIUM SERPL-MCNC: 8.9 MG/DL (ref 8.7–10.2)
CHLORIDE SERPL-SCNC: 102 MMOL/L (ref 96–106)
CHOLEST SERPL-MCNC: 202 MG/DL (ref 100–199)
CO2 SERPL-SCNC: 24 MMOL/L (ref 18–29)
CREAT SERPL-MCNC: 1.28 MG/DL (ref 0.76–1.27)
EOSINOPHIL # BLD AUTO: 0.3 X10E3/UL (ref 0–0.4)
EOSINOPHIL NFR BLD AUTO: 7 %
ERYTHROCYTE [DISTWIDTH] IN BLOOD BY AUTOMATED COUNT: 14.5 % (ref 12.3–15.4)
EST. AVERAGE GLUCOSE BLD GHB EST-MCNC: 117 MG/DL
GFR SERPLBLD CREATININE-BSD FMLA CKD-EPI: 63 ML/MIN/1.73
GFR SERPLBLD CREATININE-BSD FMLA CKD-EPI: 72 ML/MIN/1.73
GLOBULIN SER CALC-MCNC: 2.8 G/DL (ref 1.5–4.5)
GLUCOSE SERPL-MCNC: 97 MG/DL (ref 65–99)
HBA1C MFR BLD: 5.7 % (ref 4.8–5.6)
HCT VFR BLD AUTO: 48 % (ref 37.5–51)
HDLC SERPL-MCNC: 51 MG/DL
HGB BLD-MCNC: 16.8 G/DL (ref 13–17.7)
IMM GRANULOCYTES # BLD: 0 X10E3/UL (ref 0–0.1)
IMM GRANULOCYTES NFR BLD: 0 %
LDLC SERPL CALC-MCNC: 136 MG/DL (ref 0–99)
LYMPHOCYTES # BLD AUTO: 1.4 X10E3/UL (ref 0.7–3.1)
LYMPHOCYTES NFR BLD AUTO: 29 %
MCH RBC QN AUTO: 31.2 PG (ref 26.6–33)
MCHC RBC AUTO-ENTMCNC: 35 G/DL (ref 31.5–35.7)
MCV RBC AUTO: 89 FL (ref 79–97)
MONOCYTES # BLD AUTO: 0.5 X10E3/UL (ref 0.1–0.9)
MONOCYTES NFR BLD AUTO: 10 %
NEUTROPHILS # BLD AUTO: 2.5 X10E3/UL (ref 1.4–7)
NEUTROPHILS NFR BLD AUTO: 53 %
PLATELET # BLD AUTO: 209 X10E3/UL (ref 150–379)
POTASSIUM SERPL-SCNC: 4.4 MMOL/L (ref 3.5–5.2)
PROT SERPL-MCNC: 6.8 G/DL (ref 6–8.5)
RBC # BLD AUTO: 5.38 X10E6/UL (ref 4.14–5.8)
SODIUM SERPL-SCNC: 142 MMOL/L (ref 134–144)
TRIGL SERPL-MCNC: 74 MG/DL (ref 0–149)
VLDLC SERPL CALC-MCNC: 15 MG/DL (ref 5–40)
WBC # BLD AUTO: 4.8 X10E3/UL (ref 3.4–10.8)

## 2018-04-10 ENCOUNTER — OFFICE VISIT (OUTPATIENT)
Dept: INTERNAL MEDICINE CLINIC | Age: 58
End: 2018-04-10

## 2018-04-10 VITALS
HEART RATE: 77 BPM | HEIGHT: 71 IN | WEIGHT: 212 LBS | OXYGEN SATURATION: 97 % | TEMPERATURE: 97.5 F | SYSTOLIC BLOOD PRESSURE: 135 MMHG | RESPIRATION RATE: 14 BRPM | DIASTOLIC BLOOD PRESSURE: 82 MMHG | BODY MASS INDEX: 29.68 KG/M2

## 2018-04-10 DIAGNOSIS — R73.9 BORDERLINE HYPERGLYCEMIA: ICD-10-CM

## 2018-04-10 DIAGNOSIS — I10 ESSENTIAL HYPERTENSION: Primary | ICD-10-CM

## 2018-04-10 DIAGNOSIS — E78.00 HYPERCHOLESTEROLEMIA: ICD-10-CM

## 2018-04-10 NOTE — PROGRESS NOTES
1. Have you been to the ER, urgent care clinic since your last visit? Hospitalized since your last visit?no    2. Have you seen or consulted any other health care providers outside of the [address] [place] [address] since your last visit? Include any pap smears or colon screening.  no

## 2018-04-10 NOTE — MR AVS SNAPSHOT
[address] [facility] 
[phone] Patient: Samuel Leung MRN: [id number] Visit Information Date & Time Provider Department Dept. Phone Encounter #  
 4/10/2018  3:45 PM Margarita Darling, [facility] [phone] [id number] Your Appointments 8/6/2018  3:30 PM  
ROUTINE CARE with Margarita Darling MD  
[facility] [address] [facility]) Appt Note: 6 month follow up htn  
 [address] [address]  
[address] [facility] [address] [place] [address] Upcoming Health Maintenance Date Due COLONOSCOPY 7/17/1980 DTaP/Tdap/Td series (1 - Tdap) 10/2/2008 Allergies as of 4/10/2018  Review Complete On: 4/10/2018 By: Dudley Alvarez Severity Noted Reaction Type Reactions Coricidin [Phenylephrine Hcl]  05/14/2013    Hives Losartan  06/29/2015    Other (comments) Arthralgias Current Immunizations  Reviewed on 5/14/2013 Name Date Hep A Vaccine 9/12/2011 Hep B Vaccine 9/12/2011 Influenza Vaccine 10/1/2015, 10/1/2014 Influenza Vaccine (Quad) PF 1/6/2017 Influenza Vaccine PF 10/11/2013 MMR 8/8/2010 Td 10/1/2008 Not reviewed this visit You Were Diagnosed With   
  
 Codes Comments Essential hypertension    -  Primary ICD-10-CM: I10 
ICD-9-CM: 401.9 Hypercholesterolemia     ICD-10-CM: E78.00 ICD-9-CM: 272.0 Borderline hyperglycemia     ICD-10-CM: R73.9 ICD-9-CM: 790.29 Vitals BP Pulse Temp Resp Height(growth percentile) Weight(growth percentile) 135/82 (BP 1 Location: Left arm, BP Patient Position: Sitting) 77 97.5 °F (36.4 °C) (Oral) 14 5' 11\" (1.803 m) 212 lb (96.2 kg) SpO2 BMI Smoking Status 97% 29.57 kg/m2 Never Smoker Vitals History BMI and BSA Data Body Mass Index Body Surface Area 29.57 kg/m 2 2.2 m 2 Preferred Pharmacy Pharmacy Name Phone Maryellen Callahan [address] [phone] Your Updated Medication List  
  
   
This list is accurate as of 4/10/18  4:20 PM.  Always use your most recent med list. amLODIPine 10 mg tablet Commonly known as:  Unknown [place] Take 1 Tab by mouth daily. multivitamin tablet Commonly known as:  ONE A DAY Take 1 Tab by mouth daily. ZYRTEC PO Take  by mouth. To-Do List   
 08/11/2018 Lab:  HEMOGLOBIN A1C WITH EAG   
  
 08/11/2018 Lab:  LIPID PANEL   
  
 08/11/2018 Lab:  METABOLIC PANEL, COMPREHENSIVE Introducing [facility]! [employer] introduces [employer] patient portal. Now you can access parts of your medical record, email your doctor's office, and request medication refills online. 1. In your internet browser, go to [email or web address] 2. Click on the First Time User? Click Here link in the Sign In box. You will see the New Member Sign Up page. 3. Enter your [employer] Access Code exactly as it appears below. You will not need to use this code after youve completed the sign-up process. If you do not sign up before the expiration date, you must request a new code. · [employer] Access Code: [email or web address] Expires: 5/6/2018  5:30 PM 
 
4. Enter the last four digits of your Social Security Number (xxxx) and Date of Birth (mm/dd/yyyy) as indicated and click Submit. You will be taken to the next sign-up page. 5. Create a Q Holding[employer] ID. This will be your [employer] login ID and cannot be changed, so think of one that is secure and easy to remember. 6. Create a [employer] password. You can change your password at any time. 7. Enter your Password Reset Question and Answer. This can be used at a later time if you forget your password. 8. Enter your e-mail address.  You will receive e-mail notification when new information is available in [employer]. 9. Click Sign Up. You can now view and download portions of your medical record. 10. Click the Download Summary menu link to download a portable copy of your medical information. If you have questions, please visit the Frequently Asked Questions section of the [employer] website. Remember, [employer] is NOT to be used for urgent needs. For medical emergencies, dial 911. Now available from your iPhone and Android! Please provide this summary of care documentation to your next provider. Your primary care clinician is listed as [place]. If you have any questions after today's visit, please call [phone].

## 2018-04-10 NOTE — PROGRESS NOTES
SUBJECTIVE  Mr. Akshat Hanna presents today acutely for     Chief Complaint   Patient presents with    Hypertension     pt here today for 6 week f.u       His BP is lower at home, 557O-274P systolic. He is concerned about cholesterol which went up, despite adding more fish to his diet. OBJECTIVE  Visit Vitals    /82 (BP 1 Location: Left arm, BP Patient Position: Sitting)    Pulse 77    Temp 97.5 °F (36.4 °C) (Oral)    Resp 14    Ht 5' 11\" (1.803 m)    Wt 212 lb (96.2 kg)    SpO2 97%    BMI 29.57 kg/m2     Gen: Pleasant 56 y.o.  male in [facility].   HEENT: PERRLA. EOMI. OP moist and pink.  Neck: Supple.  No LAD.  HEART: RRR, No M/G/R.   LUNGS: CTAB No W/R.   ABDOMEN: S, NT, ND, BS+.   EXTREMITIES: Warm. ASSESSMENT / PLAN    ICD-10-CM ICD-9-CM    1. Essential hypertension--Borderline; no change for now. H67 320.1 METABOLIC PANEL, COMPREHENSIVE      LIPID PANEL   2. Hypercholesterolemia E78.00 272.0 LIPID PANEL   3. Borderline hyperglycemia R73.9 790.29 HEMOGLOBIN A1C WITH EAG       I have reviewed with the patient details of the assessment and plan and all questions were answered. Relevant patient education was performed.     Follow-up Disposition: August

## 2018-08-06 ENCOUNTER — OFFICE VISIT (OUTPATIENT)
Dept: INTERNAL MEDICINE CLINIC | Age: 58
End: 2018-08-06

## 2018-08-06 VITALS
OXYGEN SATURATION: 95 % | BODY MASS INDEX: 28.74 KG/M2 | RESPIRATION RATE: 18 BRPM | SYSTOLIC BLOOD PRESSURE: 132 MMHG | TEMPERATURE: 99.1 F | HEART RATE: 79 BPM | WEIGHT: 205.3 LBS | DIASTOLIC BLOOD PRESSURE: 79 MMHG | HEIGHT: 71 IN

## 2018-08-06 DIAGNOSIS — I10 ESSENTIAL HYPERTENSION: Primary | ICD-10-CM

## 2018-08-06 DIAGNOSIS — E78.00 HYPERCHOLESTEROLEMIA: ICD-10-CM

## 2018-08-06 DIAGNOSIS — R73.9 BORDERLINE HYPERGLYCEMIA: ICD-10-CM

## 2018-08-06 DIAGNOSIS — R76.8 RHEUMATOID FACTOR POSITIVE: ICD-10-CM

## 2018-08-06 RX ORDER — AMLODIPINE BESYLATE 10 MG/1
10 TABLET ORAL DAILY
Qty: 90 TAB | Refills: 3 | Status: SHIPPED | OUTPATIENT
Start: 2018-08-06 | End: 2019-08-06 | Stop reason: SDUPTHER

## 2018-08-06 NOTE — MR AVS SNAPSHOT
[address] 
[phone] Patient: Pablo Lafleur MRN: [id number] MQB:7[birth year] Visit Information Date & Time Provider Department Dept. Phone Encounter #  
 8/6/2018  3:30 PM Carol José, [address] [place] [address],[address] [phone] [id number] Follow-up Instructions Return in about 6 months (around 2/6/2019) for HTN. Follow-up and Disposition History Upcoming Health Maintenance Date Due COLONOSCOPY 7/17/1980 DTaP/Tdap/Td series (1 - Tdap) 10/2/2008 Influenza Age 5 to Adult 8/1/2018 Allergies as of 8/6/2018  Review Complete On: 8/6/2018 By: Carol José MD  
  
 Severity Noted Reaction Type Reactions Coricidin [Phenylephrine Hcl]  05/14/2013    Hives Losartan  06/29/2015    Other (comments) Arthralgias Current Immunizations  Reviewed on 5/14/2013 Name Date Hep A Vaccine 9/12/2011 Hep B Vaccine 9/12/2011 Influenza Vaccine 10/1/2015, 10/1/2014 Influenza Vaccine (Quad) PF 1/6/2017 Influenza Vaccine PF 10/11/2013 MMR 8/8/2010 Td 10/1/2008 Not reviewed this visit You Were Diagnosed With   
  
 Codes Comments Essential hypertension    -  Primary ICD-10-CM: I10 
ICD-9-CM: 401.9 Hypercholesterolemia     ICD-10-CM: E78.00 ICD-9-CM: 272.0 Rheumatoid factor positive     ICD-10-CM: R76.8 ICD-9-CM: 795.79 Borderline hyperglycemia     ICD-10-CM: R73.9 ICD-9-CM: 790.29 Vitals BP Pulse Temp Resp Height(growth percentile) Weight(growth percentile) 132/79 (BP 1 Location: Left arm, BP Patient Position: Sitting) 79 99.1 °F (37.3 °C) (Oral) 18 5' 11\" (1.803 m) 205 lb 4.8 oz (93.1 kg) SpO2 BMI Smoking Status 95% 28.63 kg/m2 Never Smoker Vitals History BMI and BSA Data Body Mass Index Body Surface Area  
 28.63 kg/m 2 2.16 m 2 Preferred Pharmacy Pharmacy Name Phone Nichole Kumar [address] [place] [address] Dr Morillo, [address] [place] [address]. [phone] Your Updated Medication List  
  
   
This list is accurate as of 8/6/18  4:17 PM.  Always use your most recent med list. amLODIPine 10 mg tablet Commonly known as:  [place] Take 1 Tab by mouth daily. multivitamin tablet Commonly known as:  ONE A DAY Take 1 Tab by mouth daily. ZYRTEC PO Take  by mouth. Follow-up Instructions Return in about 6 months (around 2/6/2019) for HTN. To-Do List   
 11/07/2018 Lab:  CBC WITH AUTOMATED DIFF   
  
 11/07/2018 Lab:  CYCLIC CITRUL PEPTIDE AB, IGG   
  
 11/07/2018 Lab:  HEMOGLOBIN A1C WITH EAG   
  
 11/07/2018 Lab:  LIPID PANEL   
  
 11/07/2018 Lab:  METABOLIC PANEL, COMPREHENSIVE   
  
 11/07/2018 Lab:  RHEUMATOID FACTOR, QL Patient Instructions Body Mass Index: Care Instructions Your Care Instructions Body mass index (BMI) can help you see if your weight is raising your risk for health problems. It uses a formula to compare how much you weigh with how tall you are. · A BMI lower than 18.5 is considered underweight. · A BMI between 18.5 and 24.9 is considered healthy. · A BMI between 25 and 29.9 is considered overweight. A BMI of 30 or higher is considered obese. If your BMI is in the normal range, it means that you have a lower risk for weight-related health problems. If your BMI is in the overweight or obese range, you may be at increased risk for weight-related health problems, such as high blood pressure, heart disease, stroke, arthritis or joint pain, and diabetes. If your BMI is in the underweight range, you may be at increased risk for health problems such as fatigue, lower protection (immunity) against illness, muscle loss, bone loss, hair loss, and hormone problems. BMI is just one measure of your risk for weight-related health problems. You may be at higher risk for health problems if you are not active, you eat an unhealthy diet, or you drink too much alcohol or use tobacco products. Follow-up care is a key part of your treatment and safety. Be sure to make and go to all appointments, and call your doctor if you are having problems. It's also a good idea to know your test results and keep a list of the medicines you take. How can you care for yourself at home? · Practice healthy eating habits. This includes eating plenty of fruits, vegetables, whole grains, lean protein, and low-fat dairy. · If your doctor recommends it, get more exercise. Walking is a good choice. Bit by bit, increase the amount you walk every day. Try for at least 30 minutes on most days of the week. · Do not smoke. Smoking can increase your risk for health problems. If you need help quitting, talk to your doctor about stop-smoking programs and medicines. These can increase your chances of quitting for good. · Limit alcohol to 2 drinks a day for men and 1 drink a day for women. Too much alcohol can cause health problems. If you have a BMI higher than 25 · Your doctor may do other tests to check your risk for weight-related health problems. This may include measuring the distance around your waist. A waist measurement of more than 40 inches in men or 35 inches in women can increase the risk of weight-related health problems. · Talk with your doctor about steps you can take to stay healthy or improve your health. You may need to make lifestyle changes to lose weight and stay healthy, such as changing your diet and getting regular exercise. If you have a BMI lower than 18.5 · Your doctor may do other tests to check your risk for health problems. · Talk with your doctor about steps you can take to stay healthy or improve your health.  You may need to make lifestyle changes to gain or maintain weight and stay healthy, such as getting more healthy foods in your diet and doing exercises to build muscle. Where can you learn more? Go to http://jessica-ro.info/. Enter S176 in the search box to learn more about \"Body Mass Index: Care Instructions. \" Current as of: October 13, 2016 Content Version: 11.4 © 7381-2222 [employer]. Care instructions adapted under license by [employer] (which disclaims liability or warranty for this information). If you have questions about a medical condition or this instruction, always ask your healthcare professional. [address] any warranty or liability for your use of this information. Introducing [facility] & HEALTH SERVICES! [employer] introduces [employer] patient portal. Now you can access parts of your medical record, email your doctor's office, and request medication refills online. 1. In your internet browser, go to [email or web address] 2. Click on the First Time User? Click Here link in the Sign In box. You will see the New Member Sign Up page. 3. Enter your [employer] Access Code exactly as it appears below. You will not need to use this code after youve completed the sign-up process. If you do not sign up before the expiration date, you must request a new code. · [employer] Access Code: [email or web address] Expires: 11/4/2018  4:17 PM 
 
4. Enter the last four digits of your Social Security Number (xxxx) and Date of Birth (mm/dd/yyyy) as indicated and click Submit. You will be taken to the next sign-up page. 5. Create a [employer] ID. This will be your [employer] login ID and cannot be changed, so think of one that is secure and easy to remember. 6. Create a [employer] password. You can change your password at any time. 7. Enter your Password Reset Question and Answer. This can be used at a later time if you forget your password. 8. Enter your e-mail address. You will receive e-mail notification when new information is available in [address] 9. Click Sign Up. You can now view and download portions of your medical record. 10. Click the Download Summary menu link to download a portable copy of your medical information. If you have questions, please visit the Frequently Asked Questions section of the [employer] website. Remember, [employer] is NOT to be used for urgent needs. For medical emergencies, dial 911. Now available from your iPhone and Android! Please provide this summary of care documentation to your next provider. Your primary care clinician is listed as [place]. If you have any questions after today's visit, please call [phone].

## 2018-08-06 NOTE — PATIENT INSTRUCTIONS
Body Mass Index: Care Instructions  Your Care Instructions    Body mass index (BMI) can help you see if your weight is raising your risk for health problems. It uses a formula to compare how much you weigh with how tall you are. · A BMI lower than 18.5 is considered underweight. · A BMI between 18.5 and 24.9 is considered healthy. · A BMI between 25 and 29.9 is considered overweight. A BMI of 30 or higher is considered obese. If your BMI is in the normal range, it means that you have a lower risk for weight-related health problems. If your BMI is in the overweight or obese range, you may be at increased risk for weight-related health problems, such as high blood pressure, heart disease, stroke, arthritis or joint pain, and diabetes. If your BMI is in the underweight range, you may be at increased risk for health problems such as fatigue, lower protection (immunity) against illness, muscle loss, bone loss, hair loss, and hormone problems. BMI is just one measure of your risk for weight-related health problems. You may be at higher risk for health problems if you are not active, you eat an unhealthy diet, or you drink too much alcohol or use tobacco products. Follow-up care is a key part of your treatment and safety. Be sure to make and go to all appointments, and call your doctor if you are having problems. It's also a good idea to know your test results and keep a list of the medicines you take. How can you care for yourself at home? · Practice healthy eating habits. This includes eating plenty of fruits, vegetables, whole grains, lean protein, and low-fat dairy. · If your doctor recommends it, get more exercise. Walking is a good choice. Bit by bit, increase the amount you walk every day. Try for at least 30 minutes on most days of the week. · Do not smoke. Smoking can increase your risk for health problems. If you need help quitting, talk to your doctor about stop-smoking programs and medicines. These can increase your chances of quitting for good. · Limit alcohol to 2 drinks a day for men and 1 drink a day for women. Too much alcohol can cause health problems. If you have a BMI higher than 25  · Your doctor may do other tests to check your risk for weight-related health problems. This may include measuring the distance around your waist. A waist measurement of more than 40 inches in men or 35 inches in women can increase the risk of weight-related health problems. · Talk with your doctor about steps you can take to stay healthy or improve your health. You may need to make lifestyle changes to lose weight and stay healthy, such as changing your diet and getting regular exercise. If you have a BMI lower than 18.5  · Your doctor may do other tests to check your risk for health problems. · Talk with your doctor about steps you can take to stay healthy or improve your health. You may need to make lifestyle changes to gain or maintain weight and stay healthy, such as getting more healthy foods in your diet and doing exercises to build muscle. Where can you learn more? Go to http://jessica-ro.info/. Enter S176 in the search box to learn more about \"Body Mass Index: Care Instructions. \"  Current as of: October 13, 2016  Content Version: 11.4  © 3099-3202 Healthwise, Incorporated. Care instructions adapted under license by [employer] (which disclaims liability or warranty for this information). If you have questions about a medical condition or this instruction, always ask your healthcare professional. [address] any warranty or liability for your use of this information.

## 2018-08-06 NOTE — PROGRESS NOTES
1. Have you been to the ER, urgent care clinic since your last visit? Hospitalized since your last visit?no    2. Have you seen or consulted any other health care providers outside of the [facility] since your last visit? Include any pap smears or colon screening.  no

## 2018-08-06 NOTE — PROGRESS NOTES
SUBJECTIVE:   Mr. Lester Culver is a 66 y.o. male who is here for follow up of routine medical issues. Previously saw Dr. Brady Mello. Chief Complaint   Patient presents with    Hypertension     pt here today for 6 month f.u     Arthritis     pt concerned of arthritis in hand and fingers    Foot Pain     pt c.o pain in L foot- pt he tripped when running        He was running and hurt his L foot on a root--tripped and came down hard on the heel. Pain in L heel. Pain in rest of foot went away after 2 weeks, but has residual heel pain. \"My arthritis is worse. [place] in the morning. \"  Some burning numb feelings in hands. He has a history of positive rheumatoid factor. He is starting to have a little joint pain and fatigue. Morning stiffness now noticed, lasting 45-60 min. As we noted in 2014: He recalls that when he was started on losartan, he developed a lot of joint pains, fatigue, and loss of exercise tolerance. \"I stopped taking it and the aches would subside. \" \"Now I'm not quite back to where I was before, but pretty near. \"   Allergies currently quiescent. At this time, he is otherwise doing well and has brought no other complaints to my attention today. For a list of the medical issues addressed today, see the assessment and plan below. PMH:   Past Medical History:   Diagnosis Date    Hx of seasonal allergies     Hypercholesterolemia 10/14/2013    Hypertension        Past Surgical History:   Procedure Laterality Date    HX ORTHOPAEDIC  1989    Back Surgery     HX WISDOM TEETH EXTRACTION         All: He is allergic to coricidin [phenylephrine hcl] and losartan. Current Outpatient Prescriptions   Medication Sig    amLODIPine (NORVASC) 10 mg tablet Take 1 Tab by mouth daily.  CETIRIZINE HCL (ZYRTEC PO) Take  by mouth.  multivitamin (ONE A DAY) tablet Take 1 Tab by mouth daily. No current facility-administered medications for this visit.         FH: His family history includes Cancer in his father; Hypertension in his mother. 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 SH: He is an  with [address] Travels a lot. He  reports that he has never smoked. He has never used smokeless tobacco. He reports that he drinks about 1.0 oz of alcohol per week  He reports that he does not use illicit drugs. ROS: See above; Complete ROS otherwise negative. OBJECTIVE:   Vitals:   Visit Vitals    /79 (BP 1 Location: Left arm, BP Patient Position: Sitting)    Pulse 79    Temp 99.1 °F (37.3 °C) (Oral)    Resp 18    Ht 5' 11\" (1.803 m)    Wt 205 lb 4.8 oz (93.1 kg)    SpO2 95%    BMI 28.63 kg/m2      Gen: Pleasant 66 y.o.  male in [facility]. HEENT: PERRLA. EOMI. OP moist and pink. Neck: Supple. No LAD. HEART: RRR, No M/G/R.    LUNGS: CTAB No W/R. ABDOMEN: S, NT, ND, BS+. EXTREMITIES: Warm. No C/C/E.  MUSCULOSKELETAL: Normal ROM, muscle strength 5/5 all groups. NEURO: Alert and oriented x 3. Cranial nerves grossly intact. No focal sensory or motor deficits noted. SKIN: Warm. Dry. No rashes or other lesions noted. Lab Results   Component Value Date/Time    Sodium 142 04/04/2018 08:12 AM    Potassium 4.4 04/04/2018 08:12 AM    Chloride 102 04/04/2018 08:12 AM    CO2 24 04/04/2018 08:12 AM    Glucose 97 04/04/2018 08:12 AM    BUN 18 04/04/2018 08:12 AM    Creatinine 1.28 (H) 04/04/2018 08:12 AM    BUN/Creatinine ratio 14 04/04/2018 08:12 AM    GFR est AA 72 04/04/2018 08:12 AM    GFR est non-AA 63 04/04/2018 08:12 AM    Calcium 8.9 04/04/2018 08:12 AM    Bilirubin, total 0.5 04/04/2018 08:12 AM    ALT (SGPT) 35 04/04/2018 08:12 AM    AST (SGOT) 23 04/04/2018 08:12 AM    Alk.  phosphatase 62 04/04/2018 08:12 AM    Protein, total 6.8 04/04/2018 08:12 AM    Albumin 4.0 04/04/2018 08:12 AM    A-G Ratio 1.4 04/04/2018 08:12 AM       Lab Results   Component Value Date/Time    Cholesterol, total 202 (H) 04/04/2018 08:12 AM    HDL Cholesterol 51 04/04/2018 08:12 AM    LDL, calculated 136 (H) 04/04/2018 08:12 AM    Triglyceride 74 04/04/2018 08:12 AM        Lab Results   Component Value Date/Time    Hemoglobin A1c 5.7 (H) 04/04/2018 08:12 AM       Lab Results   Component Value Date/Time    WBC 4.8 04/04/2018 08:12 AM    HGB 16.8 04/04/2018 08:12 AM    HCT 48.0 04/04/2018 08:12 AM    PLATELET 040 37/49/3694 08:12 AM    MCV 89 04/04/2018 08:12 AM         ASSESSMENT/ PLAN: Adriano Whitney was seen today for follow up. 1. HTN (hypertension): Borderline today. Recheck soon; Consider adding HCTZ.   - METABOLIC PANEL, COMPREHENSIVE  - CBC WITH AUTOMATED DIFF  2. Hypercholesterolemia: Recheck. 3. Probable RA: Rheumatoid factor positive. Prev referred to Dr. Tiny Hartmann. 4. Allergic rhinitis: Improved. 5. Overweight: Continue work on diet and exericse. 6. Borderline hyperglycemia: Check A1C.   7. Colon Ca screening; previously referred to Dr. Melina Ford; reminded him of this. Follow-up Disposition:  Return in about 6 months (around 2/6/2019) for HTN. I have reviewed the patient's medications and risks/side effects/benefits were discussed. Diagnosis(-es) explained to patient and questions answered. Literature provided where appropriate. Discussed the patient's BMI with him. The BMI follow up plan is as follows:     dietary management education, guidance, and counseling  encourage exercise  monitor weight  prescribed dietary intake    An After Visit Summary was printed and given to the patient.

## 2019-01-25 DIAGNOSIS — R73.9 BORDERLINE HYPERGLYCEMIA: ICD-10-CM

## 2019-01-25 DIAGNOSIS — I10 ESSENTIAL HYPERTENSION: ICD-10-CM

## 2019-01-25 DIAGNOSIS — E78.00 HYPERCHOLESTEROLEMIA: ICD-10-CM

## 2019-01-25 DIAGNOSIS — R76.8 RHEUMATOID FACTOR POSITIVE: ICD-10-CM

## 2019-01-26 LAB
ALBUMIN SERPL-MCNC: 4.4 G/DL (ref 3.5–5.5)
ALBUMIN/GLOB SERPL: 1.8 {RATIO} (ref 1.2–2.2)
ALP SERPL-CCNC: 63 IU/L (ref 39–117)
ALT SERPL-CCNC: 23 IU/L (ref 0–44)
AST SERPL-CCNC: 19 IU/L (ref 0–40)
BASOPHILS # BLD AUTO: 0 X10E3/UL (ref 0–0.2)
BASOPHILS NFR BLD AUTO: 1 %
BILIRUB SERPL-MCNC: 0.4 MG/DL (ref 0–1.2)
BUN SERPL-MCNC: 14 MG/DL (ref 6–24)
BUN/CREAT SERPL: 12 (ref 9–20)
CALCIUM SERPL-MCNC: 9 MG/DL (ref 8.7–10.2)
CCP IGA+IGG SERPL IA-ACNC: 10 UNITS (ref 0–19)
CHLORIDE SERPL-SCNC: 102 MMOL/L (ref 96–106)
CHOLEST SERPL-MCNC: 178 MG/DL (ref 100–199)
CO2 SERPL-SCNC: 24 MMOL/L (ref 20–29)
CREAT SERPL-MCNC: 1.2 MG/DL (ref 0.76–1.27)
EOSINOPHIL # BLD AUTO: 0.3 X10E3/UL (ref 0–0.4)
EOSINOPHIL NFR BLD AUTO: 4 %
ERYTHROCYTE [DISTWIDTH] IN BLOOD BY AUTOMATED COUNT: 13.7 % (ref 12.3–15.4)
EST. AVERAGE GLUCOSE BLD GHB EST-MCNC: 120 MG/DL
GLOBULIN SER CALC-MCNC: 2.4 G/DL (ref 1.5–4.5)
GLUCOSE SERPL-MCNC: 128 MG/DL (ref 65–99)
HBA1C MFR BLD: 5.8 % (ref 4.8–5.6)
HCT VFR BLD AUTO: 49.4 % (ref 37.5–51)
HDLC SERPL-MCNC: 45 MG/DL
HGB BLD-MCNC: 16.8 G/DL (ref 13–17.7)
IMM GRANULOCYTES # BLD AUTO: 0 X10E3/UL (ref 0–0.1)
IMM GRANULOCYTES NFR BLD AUTO: 0 %
LDLC SERPL CALC-MCNC: 102 MG/DL (ref 0–99)
LYMPHOCYTES # BLD AUTO: 1.6 X10E3/UL (ref 0.7–3.1)
LYMPHOCYTES NFR BLD AUTO: 27 %
MCH RBC QN AUTO: 31.2 PG (ref 26.6–33)
MCHC RBC AUTO-ENTMCNC: 34 G/DL (ref 31.5–35.7)
MCV RBC AUTO: 92 FL (ref 79–97)
MONOCYTES # BLD AUTO: 0.4 X10E3/UL (ref 0.1–0.9)
MONOCYTES NFR BLD AUTO: 7 %
NEUTROPHILS # BLD AUTO: 3.7 X10E3/UL (ref 1.4–7)
NEUTROPHILS NFR BLD AUTO: 61 %
PLATELET # BLD AUTO: 227 X10E3/UL (ref 150–379)
POTASSIUM SERPL-SCNC: 4.3 MMOL/L (ref 3.5–5.2)
PROT SERPL-MCNC: 6.8 G/DL (ref 6–8.5)
RBC # BLD AUTO: 5.39 X10E6/UL (ref 4.14–5.8)
RHEUMATOID FACT SERPL-ACNC: 10.8 IU/ML (ref 0–13.9)
SODIUM SERPL-SCNC: 142 MMOL/L (ref 134–144)
TRIGL SERPL-MCNC: 155 MG/DL (ref 0–149)
VLDLC SERPL CALC-MCNC: 31 MG/DL (ref 5–40)
WBC # BLD AUTO: 5.9 X10E3/UL (ref 3.4–10.8)

## 2019-02-06 ENCOUNTER — OFFICE VISIT (OUTPATIENT)
Dept: INTERNAL MEDICINE CLINIC | Age: 59
End: 2019-02-06

## 2019-02-06 VITALS
WEIGHT: 204.8 LBS | RESPIRATION RATE: 14 BRPM | BODY MASS INDEX: 28.67 KG/M2 | DIASTOLIC BLOOD PRESSURE: 80 MMHG | HEIGHT: 71 IN | SYSTOLIC BLOOD PRESSURE: 127 MMHG | HEART RATE: 83 BPM | OXYGEN SATURATION: 96 % | TEMPERATURE: 98.9 F

## 2019-02-06 DIAGNOSIS — R76.8 RHEUMATOID FACTOR POSITIVE: ICD-10-CM

## 2019-02-06 DIAGNOSIS — R91.1 LUNG NODULE: Primary | ICD-10-CM

## 2019-02-06 DIAGNOSIS — R05.3 CHRONIC COUGH: Primary | ICD-10-CM

## 2019-02-06 DIAGNOSIS — Z12.11 SCREEN FOR COLON CANCER: ICD-10-CM

## 2019-02-06 DIAGNOSIS — R05.9 COUGH: Primary | ICD-10-CM

## 2019-02-06 DIAGNOSIS — M05.9 RHEUMATOID ARTHRITIS WITH POSITIVE RHEUMATOID FACTOR, INVOLVING UNSPECIFIED SITE (HCC): ICD-10-CM

## 2019-02-06 RX ORDER — CODEINE PHOSPHATE AND GUAIFENESIN 10; 100 MG/5ML; MG/5ML
5 SOLUTION ORAL
Qty: 118 ML | Refills: 0 | Status: SHIPPED | OUTPATIENT
Start: 2019-02-06

## 2019-02-06 RX ORDER — FLUTICASONE PROPIONATE 220 UG/1
2 AEROSOL, METERED RESPIRATORY (INHALATION) 2 TIMES DAILY
Qty: 1 INHALER | Refills: 1 | Status: SHIPPED | OUTPATIENT
Start: 2019-02-06

## 2019-02-06 NOTE — PROGRESS NOTES
SUBJECTIVE:   Mr. Mario Tse is a 66 y.o. male who is here for follow up of routine medical issues. Previously saw Dr. Nellie Villatoro. Chief Complaint   Patient presents with    Hypertension     pt here today for 6 month f/u     He has a lingering mostly dry cough, occasionally brings up thick clear sputum. Some burning numb feelings in hands. \"Sometimes right, sometimes left. \"  \"My arthritis is worse. Christiano in the morning. \"    He has a history of positive rheumatoid factor--negative on recheck. He is starting to have a little joint pain and fatigue. Morning stiffness now noticed, lasting 45-60 min. As we noted in 2014: He recalls that when he was started on losartan, he developed a lot of joint pains, fatigue, and loss of exercise tolerance. \"I stopped taking it and the aches would subside. \" \"Now I'm not quite back to where I was before, but pretty near. \"   Allergies currently quiescent. At this time, he is otherwise doing well and has brought no other complaints to my attention today. For a list of the medical issues addressed today, see the assessment and plan below. PMH:   Past Medical History:   Diagnosis Date    Hx of seasonal allergies     Hypercholesterolemia 10/14/2013    Hypertension        Past Surgical History:   Procedure Laterality Date    HX ORTHOPAEDIC  1989    Back Surgery     HX WISDOM TEETH EXTRACTION         All: He is allergic to coricidin [phenylephrine hcl] and losartan. Current Outpatient Medications   Medication Sig    amLODIPine (NORVASC) 10 mg tablet Take 1 Tab by mouth daily.  CETIRIZINE HCL (ZYRTEC PO) Take  by mouth.  multivitamin (ONE A DAY) tablet Take 1 Tab by mouth daily. No current facility-administered medications for this visit. FH: His family history includes Cancer in his father; Hypertension in his mother.   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 SH: He is an  with [address] Travels a lot. He  reports that  has never smoked. he has never used smokeless tobacco. He reports that he drinks about 1.0 oz of alcohol per week. He reports that he does not use drugs. ROS: See above; Complete ROS otherwise negative. OBJECTIVE:   Vitals:   Visit Vitals  /80 (BP 1 Location: Left arm, BP Patient Position: Sitting)   Pulse 83   Temp 98.9 °F (37.2 °C) (Oral)   Resp 14   Ht 5' 11\" (1.803 m)   Wt 204 lb 12.8 oz (92.9 kg)   SpO2 96%   BMI 28.56 kg/m²      Gen: Pleasant 66 y.o.  male in NAD. HEENT: PERRLA. EOMI. OP moist and pink. Neck: Supple. No LAD. HEART: RRR, No M/G/R.    LUNGS: CTAB No W/R. ABDOMEN: S, NT, ND, BS+. EXTREMITIES: Warm. No C/C/E.  MUSCULOSKELETAL: Normal ROM, muscle strength 5/5 all groups. NEURO: Alert and oriented x 3. Cranial nerves grossly intact. No focal sensory or motor deficits noted. SKIN: Warm. Dry. No rashes or other lesions noted. Lab Results   Component Value Date/Time    Sodium 142 01/25/2019 02:17 PM    Potassium 4.3 01/25/2019 02:17 PM    Chloride 102 01/25/2019 02:17 PM    CO2 24 01/25/2019 02:17 PM    Glucose 128 (H) 01/25/2019 02:17 PM    BUN 14 01/25/2019 02:17 PM    Creatinine 1.20 01/25/2019 02:17 PM    BUN/Creatinine ratio 12 01/25/2019 02:17 PM    GFR est AA 78 01/25/2019 02:17 PM    GFR est non-AA 67 01/25/2019 02:17 PM    Calcium 9.0 01/25/2019 02:17 PM    Bilirubin, total 0.4 01/25/2019 02:17 PM    ALT (SGPT) 23 01/25/2019 02:17 PM    AST (SGOT) 19 01/25/2019 02:17 PM    Alk.  phosphatase 63 01/25/2019 02:17 PM    Protein, total 6.8 01/25/2019 02:17 PM    Albumin 4.4 01/25/2019 02:17 PM    A-G Ratio 1.8 01/25/2019 02:17 PM       Lab Results   Component Value Date/Time    Cholesterol, total 178 01/25/2019 02:17 PM    HDL Cholesterol 45 01/25/2019 02:17 PM    LDL, calculated 102 (H) 01/25/2019 02:17 PM    Triglyceride 155 (H) 01/25/2019 02:17 PM        Lab Results Component Value Date/Time    Hemoglobin A1c 5.8 (H) 01/25/2019 02:17 PM       Lab Results   Component Value Date/Time    WBC 5.9 01/25/2019 02:17 PM    HGB 16.8 01/25/2019 02:17 PM    HCT 49.4 01/25/2019 02:17 PM    PLATELET 000 73/58/7845 02:17 PM    MCV 92 01/25/2019 02:17 PM         ASSESSMENT/ PLAN: Hector Silverio was seen today for follow up. 1. HTN (hypertension): Borderline today. Recheck soon; Consider adding HCTZ.   - METABOLIC PANEL, COMPREHENSIVE  - CBC WITH AUTOMATED DIFF  2. Hypercholesterolemia: Recheck. 3. Possible RA: Rheumatoid factor positive. Prev referred to Dr. Aliya Perry. 4. Allergic rhinitis: Improved. 5. Overweight: Continue work on diet and exericse. 6. Borderline hyperglycemia: Check A1C.   7. Colon Ca screening; previously referred to Dr. Winston Bolanos; reminded him of this. Follow-up Disposition:  Return in about 6 months (around 8/6/2019) for RA, HTN, etc.    I have reviewed the patient's medications and risks/side effects/benefits were discussed. Diagnosis(-es) explained to patient and questions answered. Literature provided where appropriate. Discussed the patient's BMI with him. The BMI follow up plan is as follows:     dietary management education, guidance, and counseling  encourage exercise  monitor weight  prescribed dietary intake    An After Visit Summary was printed and given to the patient.

## 2019-02-06 NOTE — PROGRESS NOTES
1. Have you been to the ER, urgent care clinic since your last visit? Hospitalized since your last visit?no    2. Have you seen or consulted any other health care providers outside of the [address] [place] [address] since your last visit? Include any pap smears or colon screening.  no

## 2019-02-14 ENCOUNTER — HOSPITAL ENCOUNTER (OUTPATIENT)
Dept: CT IMAGING | Age: 59
Discharge: HOME | End: 2019-02-14
Attending: INTERNAL MEDICINE
Payer: COMMERCIAL

## 2019-02-14 ENCOUNTER — HOSPITAL ENCOUNTER (OUTPATIENT)
Dept: PULMONOLOGY | Age: 59
Discharge: HOME | End: 2019-02-14
Attending: INTERNAL MEDICINE
Payer: COMMERCIAL

## 2019-02-14 DIAGNOSIS — R91.1 LUNG NODULE: ICD-10-CM

## 2019-02-14 DIAGNOSIS — R05.3 CHRONIC COUGH: ICD-10-CM

## 2019-02-14 PROCEDURE — 94726 PLETHYSMOGRAPHY LUNG VOLUMES: CPT

## 2019-02-14 PROCEDURE — 74011636320 HC RX REV CODE- 636/320: Performed by: INTERNAL MEDICINE

## 2019-02-14 PROCEDURE — 71260 CT THORAX DX C+: CPT

## 2019-02-14 PROCEDURE — 94375 RESPIRATORY FLOW VOLUME LOOP: CPT

## 2019-02-14 PROCEDURE — 94729 DIFFUSING CAPACITY: CPT

## 2019-02-14 RX ORDER — SODIUM CHLORIDE 0.9 % (FLUSH) 0.9 %
10 SYRINGE (ML) INJECTION
Status: COMPLETED | OUTPATIENT
Start: 2019-02-14 | End: 2019-02-14

## 2019-02-14 RX ADMIN — IOPAMIDOL 80 ML: 755 INJECTION, SOLUTION INTRAVENOUS at 06:57

## 2019-02-14 RX ADMIN — Medication 10 ML: at 06:57

## 2019-02-21 NOTE — PROCEDURES
[address]  PULMONARY FUNCTION TEST    Name:  Alfred Vogt  MR#:  [id number]  :  1960  ACCOUNT #:  [de-identified]  DATE OF SERVICE:  2019    REASON FOR THE TEST:  Dyspnea. Spirometry and lung volumes were performed and they reveal:  1. No airflow obstruction. 2.  No restrictive lung disease. 3.  Normal DLCO. 4.  Normal flow-volume loop.         Corrinne Lemma, MD      [id number]  D:  2019 11:14  T:  2019 13:12  JOB #:  [id number]  CC:  Baron Asha MD

## 2019-02-22 NOTE — PROGRESS NOTES
Please call the patient and let the patient know that his test result(s) is/are normal.  Thanks. Norbert Bagley.

## 2019-08-06 ENCOUNTER — OFFICE VISIT (OUTPATIENT)
Dept: INTERNAL MEDICINE CLINIC | Age: 59
End: 2019-08-06

## 2019-08-06 VITALS
HEART RATE: 85 BPM | WEIGHT: 208.2 LBS | RESPIRATION RATE: 14 BRPM | SYSTOLIC BLOOD PRESSURE: 113 MMHG | DIASTOLIC BLOOD PRESSURE: 78 MMHG | TEMPERATURE: 98.1 F | HEIGHT: 71 IN | BODY MASS INDEX: 29.15 KG/M2 | OXYGEN SATURATION: 96 %

## 2019-08-06 DIAGNOSIS — E78.00 HYPERCHOLESTEROLEMIA: ICD-10-CM

## 2019-08-06 DIAGNOSIS — I10 ESSENTIAL HYPERTENSION: Primary | ICD-10-CM

## 2019-08-06 DIAGNOSIS — R73.9 BORDERLINE HYPERGLYCEMIA: ICD-10-CM

## 2019-08-06 RX ORDER — AMOXICILLIN AND CLAVULANATE POTASSIUM 875; 125 MG/1; MG/1
1 TABLET, FILM COATED ORAL EVERY 12 HOURS
Qty: 20 TAB | Refills: 0 | Status: SHIPPED | OUTPATIENT
Start: 2019-08-06 | End: 2019-08-16

## 2019-08-06 RX ORDER — AMLODIPINE BESYLATE 10 MG/1
10 TABLET ORAL DAILY
Qty: 90 TAB | Refills: 3 | Status: SHIPPED | OUTPATIENT
Start: 2019-08-06 | End: 2020-01-28 | Stop reason: SDUPTHER

## 2019-08-06 NOTE — PROGRESS NOTES
SUBJECTIVE:   Mr. Jonnie Brownlee is a 64 y.o. male who is here for follow up of routine medical issues. Previously saw Dr. Katrin Rojo. Chief Complaint   Patient presents with    Hypertension     pt here today for routine f.u     Sinus Infection     pt c.o congestion, pressure in face, drainage      He returned from trip to [place] and [place].  He developed a \"bad cough\", chest wall pain. He went to urgent care, and was diagnosed with sinusitis, given Rx for prednisone and an antibiotic. Now has pain in left sinuses, and \"bloody and yucky stuff coming out. \"  Has been taking sudafed. He has pains in his hands. \"Christiano in the morning. \"    He has a history of positive rheumatoid factor--negative on recheck. He is starting to have a little joint pain and fatigue. +Morning stiffness lasting 45-60 min. As we noted in 2014: He recalls that when he was started on losartan, he developed a lot of joint pains, fatigue, and loss of exercise tolerance. \"I stopped taking it and the aches would subside. \" \"Now I'm not quite back to where I was before, but pretty near. \"   Allergies currently quiescent. At this time, he is otherwise doing well and has brought no other complaints to my attention today. For a list of the medical issues addressed today, see the assessment and plan below. PMH:   Past Medical History:   Diagnosis Date    Hx of seasonal allergies     Hypercholesterolemia 10/14/2013    Hypertension        Past Surgical History:   Procedure Laterality Date    HX ORTHOPAEDIC  1989    Back Surgery     HX WISDOM TEETH EXTRACTION         All: He is allergic to coricidin [phenylephrine hcl] and losartan. Current Outpatient Medications   Medication Sig    guaiFENesin-codeine (ROBAFEN AC) 100-10 mg/5 mL solution Take 5 mL by mouth three (3) times daily as needed for Cough. Max Daily Amount: 15 mL.  amLODIPine (NORVASC) 10 mg tablet Take 1 Tab by mouth daily.     CETIRIZINE HCL (ZYRTEC PO) Take  by mouth.    multivitamin (ONE A DAY) tablet Take 1 Tab by mouth daily.  fluticasone (FLOVENT HFA) 220 mcg/actuation inhaler Take 2 Puffs by inhalation two (2) times a day. No current facility-administered medications for this visit. FH: His family history includes Cancer in his father; Hypertension in his mother. 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 SH: He is an  with [address] Travels a lot. He  reports that he has never smoked. He has never used smokeless tobacco. He reports that he drinks about 1.7 standard drinks of alcohol per week. He reports that he does not use drugs. ROS: See above; Complete ROS otherwise negative. OBJECTIVE:   Vitals:   Visit Vitals  /78 (BP 1 Location: Left arm, BP Patient Position: Sitting)   Pulse 85   Temp 98.1 °F (36.7 °C) (Oral)   Resp 14   Ht 5' 11\" (1.803 m)   Wt 208 lb 3.2 oz (94.4 kg)   SpO2 96%   BMI 29.04 kg/m²      Gen: Pleasant 64 y.o.  male in NAD. HEENT: PERRLA. EOMI. OP moist and pink. Neck: Supple. No LAD. HEART: RRR, No M/G/R.    LUNGS: CTAB No W/R. ABDOMEN: S, NT, ND, BS+. EXTREMITIES: Warm. No C/C/E.  MUSCULOSKELETAL: Normal ROM, muscle strength 5/5 all groups. NEURO: Alert and oriented x 3. Cranial nerves grossly intact. No focal sensory or motor deficits noted. SKIN: Warm. Dry. No rashes or other lesions noted.     Lab Results   Component Value Date/Time    Sodium 142 01/25/2019 02:17 PM    Potassium 4.3 01/25/2019 02:17 PM    Chloride 102 01/25/2019 02:17 PM    CO2 24 01/25/2019 02:17 PM    Glucose 128 (H) 01/25/2019 02:17 PM    BUN 14 01/25/2019 02:17 PM    Creatinine 1.20 01/25/2019 02:17 PM    BUN/Creatinine ratio 12 01/25/2019 02:17 PM    GFR est AA 78 01/25/2019 02:17 PM    GFR est non-AA 67 01/25/2019 02:17 PM    Calcium 9.0 01/25/2019 02:17 PM    Bilirubin, total 0.4 01/25/2019 02:17 PM    ALT (SGPT) 23 01/25/2019 02:17 PM    AST (SGOT) 19 01/25/2019 02:17 PM    Alk. phosphatase 63 01/25/2019 02:17 PM    Protein, total 6.8 01/25/2019 02:17 PM    Albumin 4.4 01/25/2019 02:17 PM    A-G Ratio 1.8 01/25/2019 02:17 PM       Lab Results   Component Value Date/Time    Cholesterol, total 178 01/25/2019 02:17 PM    HDL Cholesterol 45 01/25/2019 02:17 PM    LDL, calculated 102 (H) 01/25/2019 02:17 PM    Triglyceride 155 (H) 01/25/2019 02:17 PM        Lab Results   Component Value Date/Time    Hemoglobin A1c 5.8 (H) 01/25/2019 02:17 PM       Lab Results   Component Value Date/Time    WBC 5.9 01/25/2019 02:17 PM    HGB 16.8 01/25/2019 02:17 PM    HCT 49.4 01/25/2019 02:17 PM    PLATELET 025 63/33/8973 02:17 PM    MCV 92 01/25/2019 02:17 PM         ASSESSMENT/ PLAN: Deloris Lantigua was seen today for follow up. 1. Acute URI / Sinusitis: Rx for augmentin. 2. HTN (hypertension): Borderline today. Recheck soon; Consider adding HCTZ.   - METABOLIC PANEL, COMPREHENSIVE  - CBC WITH AUTOMATED DIFF  3. Hypercholesterolemia: Recheck. 4. Arthritis multiple joints (but reed hands) + Morning stiffness. RA and CCP negative. Prev referred to Dr. Josh Altamirano. 5. Allergic rhinitis: Improved. 6. Overweight: Continue work on diet and exericse. 7. Borderline hyperglycemia: Check A1C.   8. Colon Ca screening; previously referred to Dr. Michelle Tafoya. I have reviewed the patient's medications and risks/side effects/benefits were discussed. Diagnosis(-es) explained to patient and questions answered. Literature provided where appropriate. Follow-up and Dispositions    · Return in about 6 months (around 2/6/2020) for HTN. Discussed the patient's BMI with him. The BMI follow up plan is as follows:     dietary management education, guidance, and counseling  encourage exercise  monitor weight  prescribed dietary intake    An After Visit Summary was printed and given to the patient.

## 2019-08-06 NOTE — PROGRESS NOTES
1. Have you been to the ER, urgent care clinic since your last visit? Hospitalized since your last visit? No     2. Have you seen or consulted any other health care providers outside of the [address] [place] [address] since your last visit? Include any pap smears or colon screening.  Urgent care for cough

## 2019-10-02 ENCOUNTER — TELEPHONE (OUTPATIENT)
Dept: INTERNAL MEDICINE CLINIC | Age: 59
End: 2019-10-02

## 2019-10-02 LAB
ALBUMIN SERPL-MCNC: 4.1 G/DL (ref 3.5–5.5)
ALBUMIN/GLOB SERPL: 1.7 {RATIO} (ref 1.2–2.2)
ALP SERPL-CCNC: 56 IU/L (ref 39–117)
ALT SERPL-CCNC: 26 IU/L (ref 0–44)
AST SERPL-CCNC: 24 IU/L (ref 0–40)
BASOPHILS # BLD AUTO: 0.1 X10E3/UL (ref 0–0.2)
BASOPHILS NFR BLD AUTO: 1 %
BILIRUB SERPL-MCNC: 0.6 MG/DL (ref 0–1.2)
BUN SERPL-MCNC: 16 MG/DL (ref 6–24)
BUN/CREAT SERPL: 13 (ref 9–20)
CALCIUM SERPL-MCNC: 8.9 MG/DL (ref 8.7–10.2)
CHLORIDE SERPL-SCNC: 106 MMOL/L (ref 96–106)
CHOLEST SERPL-MCNC: 181 MG/DL (ref 100–199)
CO2 SERPL-SCNC: 23 MMOL/L (ref 20–29)
CREAT SERPL-MCNC: 1.19 MG/DL (ref 0.76–1.27)
EOSINOPHIL # BLD AUTO: 0.3 X10E3/UL (ref 0–0.4)
EOSINOPHIL NFR BLD AUTO: 7 %
ERYTHROCYTE [DISTWIDTH] IN BLOOD BY AUTOMATED COUNT: 13 % (ref 12.3–15.4)
EST. AVERAGE GLUCOSE BLD GHB EST-MCNC: 117 MG/DL
GLOBULIN SER CALC-MCNC: 2.4 G/DL (ref 1.5–4.5)
GLUCOSE SERPL-MCNC: 101 MG/DL (ref 65–99)
HBA1C MFR BLD: 5.7 % (ref 4.8–5.6)
HCT VFR BLD AUTO: 49.2 % (ref 37.5–51)
HDLC SERPL-MCNC: 47 MG/DL
HGB BLD-MCNC: 16.6 G/DL (ref 13–17.7)
IMM GRANULOCYTES # BLD AUTO: 0 X10E3/UL (ref 0–0.1)
IMM GRANULOCYTES NFR BLD AUTO: 0 %
LDLC SERPL CALC-MCNC: 117 MG/DL (ref 0–99)
LYMPHOCYTES # BLD AUTO: 1.3 X10E3/UL (ref 0.7–3.1)
LYMPHOCYTES NFR BLD AUTO: 26 %
MCH RBC QN AUTO: 30.1 PG (ref 26.6–33)
MCHC RBC AUTO-ENTMCNC: 33.7 G/DL (ref 31.5–35.7)
MCV RBC AUTO: 89 FL (ref 79–97)
MONOCYTES # BLD AUTO: 0.5 X10E3/UL (ref 0.1–0.9)
MONOCYTES NFR BLD AUTO: 10 %
NEUTROPHILS # BLD AUTO: 2.8 X10E3/UL (ref 1.4–7)
NEUTROPHILS NFR BLD AUTO: 56 %
PLATELET # BLD AUTO: 226 X10E3/UL (ref 150–450)
POTASSIUM SERPL-SCNC: 4.4 MMOL/L (ref 3.5–5.2)
PROT SERPL-MCNC: 6.5 G/DL (ref 6–8.5)
RBC # BLD AUTO: 5.51 X10E6/UL (ref 4.14–5.8)
SODIUM SERPL-SCNC: 144 MMOL/L (ref 134–144)
TRIGL SERPL-MCNC: 83 MG/DL (ref 0–149)
VLDLC SERPL CALC-MCNC: 17 MG/DL (ref 5–40)
WBC # BLD AUTO: 4.9 X10E3/UL (ref 3.4–10.8)

## 2020-01-28 NOTE — TELEPHONE ENCOUNTER
----- Message from Rodriguez Erazo sent at 1/27/2020  4:51 PM EST -----  Regarding: Dr. Lance Brandon  Medication Refill    Caller (if not patient):      Relationship of caller (if not patient):      Best contact number(s):[phone]      Name of medication and dosage if known:  amLODIPine (NORVASC) 10 mg tablet [[id number]]     Is patient out of this medication (yes/no): Y      Pharmacy name: [address]    Pharmacy listed in chart? (yes/no): Y  Pharmacy phone [phone]      Details to clarify the request:      Rodriguez Erazo

## 2020-01-29 RX ORDER — AMLODIPINE BESYLATE 10 MG/1
10 TABLET ORAL DAILY
Qty: 90 TAB | Refills: 3 | Status: SHIPPED | OUTPATIENT
Start: 2020-01-29 | End: 2020-10-12

## 2020-06-25 ENCOUNTER — VIRTUAL VISIT (OUTPATIENT)
Dept: INTERNAL MEDICINE CLINIC | Age: 60
End: 2020-06-25

## 2020-06-25 DIAGNOSIS — M79.643 PAIN OF HAND, UNSPECIFIED LATERALITY: Primary | ICD-10-CM

## 2020-06-25 DIAGNOSIS — R73.9 BORDERLINE HYPERGLYCEMIA: ICD-10-CM

## 2020-06-25 DIAGNOSIS — I10 ESSENTIAL HYPERTENSION: ICD-10-CM

## 2020-06-25 DIAGNOSIS — E78.00 HYPERCHOLESTEROLEMIA: ICD-10-CM

## 2020-06-25 NOTE — PROGRESS NOTES
Rosalind Mckinney is a 64 y.o. male who was seen by synchronous (real-time) audio-video technology on 6/25/2020. Consent: Rosalind Mckinney, who was seen by synchronous (real-time) audio-video technology, and/or his healthcare decision maker, is aware that this patient-initiated, Telehealth encounter on 6/25/2020 is a billable service, with coverage as determined by his insurance carrier. He is aware that he may receive a bill and has provided verbal consent to proceed: Yes. Subjective:   Rosalind Mckinney is a 64 y.o. male who was seen for Hypertension (6 month f.u) and Hand Pain (pt c.o pain in both hands, pain is worse in R hand for several weeks; pt has been doing yard work; & pt has a concern of Lyme Disease; today pt rates pain 2/10 on pain scale; at times pain wakes him up in the middle of the night)    SUBJECTIVE:   Mr. Rosalind Mckinney is a 64 y.o. male who is here for follow up of routine medical issues. Previously saw Dr. Nat Davis. Chief Complaint   Patient presents with    Hypertension     6 month f.u    Hand Pain     pt c.o pain in both hands, pain is worse in R hand for several weeks; pt has been doing yard work; & pt has a concern of Lyme Disease; today pt rates pain 2/10 on pain scale; at times pain wakes him up in the middle of the night     BP at home 125/90, 81. Weight 202 1/2 lb. He has pains in his hands. \"Christiano in the morning. \"  \"The knuckles feel like pins in them, and the fingertips feel like they are shooting fire. \" \"Sometimes I feel an electric shock in my wrist.\"  He has a history of positive rheumatoid factor--negative on recheck, along with negative CCP. He is starting to have a little joint pain and fatigue. +Morning stiffness lasting 45-60 min. As we noted in 2014: He recalls that when he was started on losartan, he developed a lot of joint pains, fatigue, and loss of exercise tolerance. \"I stopped taking it and the aches would subside. \" \"Now I'm not quite back to where I was before, but pretty near. \"   Allergies currently quiescent. At this time, he is otherwise doing well and has brought no other complaints to my attention today. For a list of the medical issues addressed today, see the assessment and plan below. PMH:   Past Medical History:   Diagnosis Date    Hx of seasonal allergies     Hypercholesterolemia 10/14/2013    Hypertension        Past Surgical History:   Procedure Laterality Date    HX ORTHOPAEDIC  1989    Back Surgery     HX WISDOM TEETH EXTRACTION         All: He is allergic to coricidin [phenylephrine hcl] and losartan. Current Outpatient Medications   Medication Sig    docosahexaenoic acid/epa (FISH OIL PO) Take  by mouth.  OTHER Tumeric po    amLODIPine (NORVASC) 10 mg tablet Take 1 Tab by mouth daily.  CETIRIZINE HCL (ZYRTEC PO) Take  by mouth.  multivitamin (ONE A DAY) tablet Take 1 Tab by mouth daily.  fluticasone (FLOVENT HFA) 220 mcg/actuation inhaler Take 2 Puffs by inhalation two (2) times a day.  guaiFENesin-codeine (ROBAFEN AC) 100-10 mg/5 mL solution Take 5 mL by mouth three (3) times daily as needed for Cough. Max Daily Amount: 15 mL. No current facility-administered medications for this visit. FH: His family history includes Cancer in his father; Hypertension in his mother. 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 SH: He is an  with [address] Travels a lot. He  reports that he has never smoked. He has never used smokeless tobacco. He reports current alcohol use of about 1.7 standard drinks of alcohol per week. He reports that he does not use drugs. ROS: See above; Complete ROS otherwise negative. OBJECTIVE:   Vitals: There were no vitals taken for this visit. Gen: Pleasant 64 y.o.  male in NAD.       Lab Results   Component Value Date/Time    Sodium 144 10/01/2019 08:56 AM    Potassium 4.4 10/01/2019 08:56 AM    Chloride 106 10/01/2019 08:56 AM    CO2 23 10/01/2019 08:56 AM    Glucose 101 (H) 10/01/2019 08:56 AM    BUN 16 10/01/2019 08:56 AM    Creatinine 1.19 10/01/2019 08:56 AM    BUN/Creatinine ratio 13 10/01/2019 08:56 AM    GFR est AA 78 10/01/2019 08:56 AM    GFR est non-AA 67 10/01/2019 08:56 AM    Calcium 8.9 10/01/2019 08:56 AM    Bilirubin, total 0.6 10/01/2019 08:56 AM    ALT (SGPT) 26 10/01/2019 08:56 AM    Alk. phosphatase 56 10/01/2019 08:56 AM    Protein, total 6.5 10/01/2019 08:56 AM    Albumin 4.1 10/01/2019 08:56 AM    A-G Ratio 1.7 10/01/2019 08:56 AM       Lab Results   Component Value Date/Time    Cholesterol, total 181 10/01/2019 08:56 AM    HDL Cholesterol 47 10/01/2019 08:56 AM    LDL, calculated 117 (H) 10/01/2019 08:56 AM    Triglyceride 83 10/01/2019 08:56 AM        Lab Results   Component Value Date/Time    Hemoglobin A1c 5.7 (H) 10/01/2019 08:56 AM       Lab Results   Component Value Date/Time    WBC 4.9 10/01/2019 08:56 AM    HGB 16.6 10/01/2019 08:56 AM    HCT 49.2 10/01/2019 08:56 AM    PLATELET 836 33/15/5264 08:56 AM    MCV 89 10/01/2019 08:56 AM         ASSESSMENT/ PLAN: Adrianne De La Vega was seen today for follow up. 1. Arthralgias hands: May be some neuropathy as well. Worsening. Refer to Dr. Jono Sheriff  2. HTN (hypertension): Borderline today. Recheck soon; Consider adding HCTZ.   - METABOLIC PANEL, COMPREHENSIVE  - CBC WITH AUTOMATED DIFF  3. Hypercholesterolemia: Recheck. 4. Arthritis multiple joints (but reed hands) + Morning stiffness. RA and CCP negative. Prev referred to Dr. Esther Galeano. 5. Allergic rhinitis: Improved. 6. Overweight: Continue work on diet and exericse. 7. Borderline hyperglycemia: Check A1C.   8. Colon Ca screening; previously referred to Dr. Rea Vizcaino. I have reviewed the patient's medications and risks/side effects/benefits were discussed. Diagnosis(-es) explained to patient and questions answered. Literature provided where appropriate. Follow-up and Dispositions    · Return in about 6 months (around 12/25/2020) for hTN. Objective:   Vital Signs: (As obtained by patient/caregiver at home)  There were no vitals taken for this visit. [INSTRUCTIONS:  \"[x]\" Indicates a positive item  \"[]\" Indicates a negative item  -- DELETE ALL ITEMS NOT EXAMINED]    Constitutional: [x] Appears well-developed and well-nourished [x] No apparent distress      [] Abnormal -     Mental status: [x] Alert and awake  [x] Oriented to person/place/time [x] Able to follow commands    [] Abnormal -     Eyes:   EOM    [x]  Normal    [] Abnormal -   Sclera  [x]  Normal    [] Abnormal -          Discharge [x]  None visible   [] Abnormal -     HENT: [x] Normocephalic, atraumatic  [] Abnormal -   [x] Mouth/Throat: Mucous membranes are moist    External Ears [x] Normal  [] Abnormal -    Neck: [x] No visualized mass [] Abnormal -     Pulmonary/Chest: [x] Respiratory effort normal   [x] No visualized signs of difficulty breathing or respiratory distress        [] Abnormal -      Musculoskeletal:   [x] Normal gait with no signs of ataxia         [x] Normal range of motion of neck        [] Abnormal -     Neurological:        [x] No Facial Asymmetry (Cranial nerve 7 motor function) (limited exam due to video visit)          [x] No gaze palsy        [] Abnormal -          Skin:        [x] No significant exanthematous lesions or discoloration noted on facial skin         [] Abnormal -            Psychiatric:       [x] Normal Affect [] Abnormal -        [x] No Hallucinations    Other pertinent observable physical exam findings:-        We discussed the expected course, resolution and complications of the diagnosis(es) in detail. Medication risks, benefits, costs, interactions, and alternatives were discussed as indicated. I advised him to contact the office if his condition worsens, changes or fails to improve as anticipated.  He expressed understanding with the diagnosis(es) and plan. Kirk Campbell is a 64 y.o. male who was evaluated by a video visit encounter for concerns as above. Patient identification was verified prior to start of the visit. A caregiver was present when appropriate. Due to this being a TeleHealth encounter (During RWSDD-79 public health emergency), evaluation of the following organ systems was limited: Vitals/Constitutional/EENT/Resp/CV/GI//MS/Neuro/Skin/Heme-Lymph-Imm. Pursuant to the emergency declaration under the [facility], [facility] waiver authority and the [employer] and [employer] Act, this Virtual  Visit was conducted, with patient's (and/or legal guardian's) consent, to reduce the patient's risk of exposure to COVID-19 and provide necessary medical care. Services were provided through a video synchronous discussion virtually to substitute for in-person clinic visit. Patient and provider were located at their individual homes.       Jin Graves MD

## 2020-06-25 NOTE — PROGRESS NOTES
1. Have you been to the ER, urgent care clinic since your last visit? Hospitalized since your last visit?no    2. Have you seen or consulted any other health care providers outside of the [address] [place] [address] since your last visit? Include any pap smears or colon screening.  no

## 2020-06-26 ENCOUNTER — TELEPHONE (OUTPATIENT)
Dept: INTERNAL MEDICINE CLINIC | Age: 60
End: 2020-06-26

## 2020-10-12 RX ORDER — AMLODIPINE BESYLATE 10 MG/1
TABLET ORAL
Qty: 330 TAB | Refills: 0 | Status: SHIPPED | OUTPATIENT
Start: 2020-10-12 | End: 2021-08-07

## 2021-02-23 ENCOUNTER — OFFICE VISIT (OUTPATIENT)
Dept: INTERNAL MEDICINE CLINIC | Age: 61
End: 2021-02-23
Payer: COMMERCIAL

## 2021-02-23 VITALS
WEIGHT: 198.6 LBS | TEMPERATURE: 96.3 F | BODY MASS INDEX: 27.8 KG/M2 | OXYGEN SATURATION: 98 % | HEART RATE: 78 BPM | DIASTOLIC BLOOD PRESSURE: 85 MMHG | HEIGHT: 71 IN | RESPIRATION RATE: 18 BRPM | SYSTOLIC BLOOD PRESSURE: 138 MMHG

## 2021-02-23 DIAGNOSIS — I10 ESSENTIAL HYPERTENSION: Primary | ICD-10-CM

## 2021-02-23 DIAGNOSIS — E78.00 HYPERCHOLESTEROLEMIA: ICD-10-CM

## 2021-02-23 PROCEDURE — 99214 OFFICE O/P EST MOD 30 MIN: CPT | Performed by: INTERNAL MEDICINE

## 2021-02-23 NOTE — PROGRESS NOTES
SUBJECTIVE:   Mr. Jarek Colunga is a 64 y.o. male who is here for follow up of routine medical issues. Previously saw Dr. Domonique Mahan. Chief Complaint   Patient presents with    Hypertension     6 month f.u        He had a nose bleed Sunday. \"That's very unusual; maybe dry winter air. \"  He had carpal tunnel surgery in September, bilaterally. He has a history of positive rheumatoid factor--negative on recheck, along with negative CCP. He is starting to have a little joint pain and fatigue. +Morning stiffness lasting 45-60 min. As we noted in 2014: He recalls that when he was started on losartan, he developed a lot of joint pains, fatigue, and loss of exercise tolerance. \"I stopped taking it and the aches would subside. \" \"Now I'm not quite back to where I was before, but pretty near. \"   Allergies currently quiescent. At this time, he is otherwise doing well and has brought no other complaints to my attention today. For a list of the medical issues addressed today, see the assessment and plan below. PMH:   Past Medical History:   Diagnosis Date    Hx of seasonal allergies     Hypercholesterolemia 10/14/2013    Hypertension        Past Surgical History:   Procedure Laterality Date    HX CARPAL TUNNEL RELEASE  09/2020    L & R hand     HX ORTHOPAEDIC  1989    Back Surgery     HX WISDOM TEETH EXTRACTION         All: He is allergic to coricidin [phenylephrine hcl] and losartan. Current Outpatient Medications   Medication Sig    amLODIPine (NORVASC) 10 mg tablet TAKE ONE TABLET BY MOUTH ONE TIME DAILY    docosahexaenoic acid/epa (FISH OIL PO) Take  by mouth.  OTHER Tumeric po    CETIRIZINE HCL (ZYRTEC PO) Take  by mouth.  multivitamin (ONE A DAY) tablet Take 1 Tab by mouth daily.  fluticasone (FLOVENT HFA) 220 mcg/actuation inhaler Take 2 Puffs by inhalation two (2) times a day.     guaiFENesin-codeine (ROBAFEN AC) 100-10 mg/5 mL solution Take 5 mL by mouth three (3) times daily as needed for Cough. Max Daily Amount: 15 mL. No current facility-administered medications for this visit. FH: His family history includes Cancer in his father; Hypertension in his mother. 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 SH: He is an  with [address] Travels a lot. He  reports that he has never smoked. He has never used smokeless tobacco. He reports current alcohol use of about 1.7 standard drinks of alcohol per week. He reports that he does not use drugs. ROS: See above; Complete ROS otherwise negative. OBJECTIVE:   Vitals:   Visit Vitals  /85 (BP 1 Location: Left upper arm, BP Patient Position: Sitting, BP Cuff Size: Adult)   Pulse 78   Temp (!) 96.3 °F (35.7 °C) (Temporal)   Resp 18   Ht 5' 11\" (1.803 m)   Wt 198 lb 9.6 oz (90.1 kg)   SpO2 98%   BMI 27.70 kg/m²      Gen: Pleasant 64 y.o.  male in NAD. HEENT: PERRLA. EOMI. OP moist and pink. Neck: Supple. No LAD. HEART: RRR, No M/G/R.    LUNGS: CTAB No W/R. ABDOMEN: S, NT, ND, BS+. EXTREMITIES: Warm. No C/C/E.  MUSCULOSKELETAL: Normal ROM, muscle strength 5/5 all groups. NEURO: Alert and oriented x 3. Cranial nerves grossly intact. No focal sensory or motor deficits noted. SKIN: Warm. Dry. No rashes or other lesions noted. Lab Results   Component Value Date/Time    Sodium 144 10/01/2019 08:56 AM    Potassium 4.4 10/01/2019 08:56 AM    Chloride 106 10/01/2019 08:56 AM    CO2 23 10/01/2019 08:56 AM    Glucose 101 (H) 10/01/2019 08:56 AM    BUN 16 10/01/2019 08:56 AM    Creatinine 1.19 10/01/2019 08:56 AM    BUN/Creatinine ratio 13 10/01/2019 08:56 AM    GFR est AA 78 10/01/2019 08:56 AM    GFR est non-AA 67 10/01/2019 08:56 AM    Calcium 8.9 10/01/2019 08:56 AM    Bilirubin, total 0.6 10/01/2019 08:56 AM    ALT (SGPT) 26 10/01/2019 08:56 AM    Alk.  phosphatase 56 10/01/2019 08:56 AM    Protein, total 6.5 10/01/2019 08:56 AM    Albumin 4.1 10/01/2019 08:56 AM    A-G Ratio 1.7 10/01/2019 08:56 AM       Lab Results   Component Value Date/Time    Cholesterol, total 181 10/01/2019 08:56 AM    HDL Cholesterol 47 10/01/2019 08:56 AM    LDL, calculated 117 (H) 10/01/2019 08:56 AM    Triglyceride 83 10/01/2019 08:56 AM        Lab Results   Component Value Date/Time    Hemoglobin A1c 5.7 (H) 10/01/2019 08:56 AM       Lab Results   Component Value Date/Time    WBC 4.9 10/01/2019 08:56 AM    HGB 16.6 10/01/2019 08:56 AM    HCT 49.2 10/01/2019 08:56 AM    PLATELET 523 28/14/1007 08:56 AM    MCV 89 10/01/2019 08:56 AM         ASSESSMENT/ PLAN: Harry Sinclair was seen today for follow up. 1. HTN (hypertension): Borderline today. Recheck soon; Consider adding HCTZ.   - METABOLIC PANEL, COMPREHENSIVE  - CBC WITH AUTOMATED DIFF  2. Hypercholesterolemia: Recheck. 3. Hand pain all but gone since CTS surgery. 4. Arthritis: Doing better. (Arthritis multiple joints + Morning stiffness. RA and CCP negative. Prev referred to Dr. Nancy Fields; he never went). 5. Allergic rhinitis: Improved. Zyrtec. 6. Overweight: Continue work on diet and exericse. 7. Borderline hyperglycemia: Check A1C.   8. Colon Ca screening; previously referred to Dr. Namrata Renteria. I have reviewed the patient's medications and risks/side effects/benefits were discussed. Diagnosis(-es) explained to patient and questions answered. Literature provided where appropriate. Follow-up and Dispositions    · Return in about 6 months (around 8/23/2021) for HTN.

## 2021-02-23 NOTE — PATIENT INSTRUCTIONS
Office Policies Phone calls/patient messages: Please allow up to 24 hours for someone in the office to contact you about your call or message. Be mindful your provider may be out of the office or your message may require further review. We encourage you to use [email or web address] for your messages as this is a faster, more efficient way to communicate with our office Medication Refills: 
         
Prescription medications require 48-72 business hours to process. We encourage you to use [email or web address] for your refills. For controlled medications: Please allow 72 business hours to process. Certain medications may require you to  a written prescription at our office. NO narcotic/controlled medications will be prescribed after 4pm Monday through Friday or on weekends Form/Paperwork Completion: 
         
Please note a $25 fee may incur for all paperwork for completed by our providers. We ask that you allow 7-10 business days. Pre-payment is due prior to picking up/faxing the completed form. You may also download your forms to [email or web address] to have your doctor print off. 
 
 
1. Have you been to the ER, urgent care clinic since your last visit? Hospitalized since your last visit? no 
 
2. Have you seen or consulted any other health care providers outside of the [address] [place] [address] since your last visit? Include any pap smears or colon screening.  no

## 2021-02-24 LAB
ALBUMIN SERPL-MCNC: 4.3 G/DL (ref 3.8–4.9)
ALBUMIN/GLOB SERPL: 1.8 {RATIO} (ref 1.2–2.2)
ALP SERPL-CCNC: 69 IU/L (ref 39–117)
ALT SERPL-CCNC: 19 IU/L (ref 0–44)
AST SERPL-CCNC: 20 IU/L (ref 0–40)
BASOPHILS # BLD AUTO: 0.1 X10E3/UL (ref 0–0.2)
BASOPHILS NFR BLD AUTO: 1 %
BILIRUB SERPL-MCNC: 0.4 MG/DL (ref 0–1.2)
BUN SERPL-MCNC: 13 MG/DL (ref 6–24)
BUN/CREAT SERPL: 13 (ref 9–20)
CALCIUM SERPL-MCNC: 9.1 MG/DL (ref 8.7–10.2)
CHLORIDE SERPL-SCNC: 104 MMOL/L (ref 96–106)
CHOLEST SERPL-MCNC: 184 MG/DL (ref 100–199)
CO2 SERPL-SCNC: 25 MMOL/L (ref 20–29)
CREAT SERPL-MCNC: 1.04 MG/DL (ref 0.76–1.27)
EOSINOPHIL # BLD AUTO: 0.2 X10E3/UL (ref 0–0.4)
EOSINOPHIL NFR BLD AUTO: 5 %
ERYTHROCYTE [DISTWIDTH] IN BLOOD BY AUTOMATED COUNT: 12.7 % (ref 11.6–15.4)
EST. AVERAGE GLUCOSE BLD GHB EST-MCNC: 117 MG/DL
GLOBULIN SER CALC-MCNC: 2.4 G/DL (ref 1.5–4.5)
GLUCOSE SERPL-MCNC: 104 MG/DL (ref 65–99)
HBA1C MFR BLD: 5.7 % (ref 4.8–5.6)
HCT VFR BLD AUTO: 51.6 % (ref 37.5–51)
HDLC SERPL-MCNC: 51 MG/DL
HGB BLD-MCNC: 17.7 G/DL (ref 13–17.7)
IMM GRANULOCYTES # BLD AUTO: 0 X10E3/UL (ref 0–0.1)
IMM GRANULOCYTES NFR BLD AUTO: 0 %
LDLC SERPL CALC-MCNC: 113 MG/DL (ref 0–99)
LYMPHOCYTES # BLD AUTO: 1.1 X10E3/UL (ref 0.7–3.1)
LYMPHOCYTES NFR BLD AUTO: 23 %
MCH RBC QN AUTO: 30.8 PG (ref 26.6–33)
MCHC RBC AUTO-ENTMCNC: 34.3 G/DL (ref 31.5–35.7)
MCV RBC AUTO: 90 FL (ref 79–97)
MONOCYTES # BLD AUTO: 0.6 X10E3/UL (ref 0.1–0.9)
MONOCYTES NFR BLD AUTO: 11 %
NEUTROPHILS # BLD AUTO: 3 X10E3/UL (ref 1.4–7)
NEUTROPHILS NFR BLD AUTO: 60 %
PLATELET # BLD AUTO: 213 X10E3/UL (ref 150–450)
POTASSIUM SERPL-SCNC: 4.6 MMOL/L (ref 3.5–5.2)
PROT SERPL-MCNC: 6.7 G/DL (ref 6–8.5)
RBC # BLD AUTO: 5.74 X10E6/UL (ref 4.14–5.8)
SODIUM SERPL-SCNC: 142 MMOL/L (ref 134–144)
TRIGL SERPL-MCNC: 114 MG/DL (ref 0–149)
VLDLC SERPL CALC-MCNC: 20 MG/DL (ref 5–40)
WBC # BLD AUTO: 5 X10E3/UL (ref 3.4–10.8)

## 2021-03-01 ENCOUNTER — TELEPHONE (OUTPATIENT)
Dept: INTERNAL MEDICINE CLINIC | Age: 61
End: 2021-03-01

## 2021-08-07 RX ORDER — AMLODIPINE BESYLATE 10 MG/1
TABLET ORAL
Qty: 330 TABLET | Refills: 0 | Status: SHIPPED | OUTPATIENT
Start: 2021-08-07 | End: 2022-05-22

## 2022-03-21 ENCOUNTER — OFFICE VISIT (OUTPATIENT)
Dept: INTERNAL MEDICINE CLINIC | Age: 62
End: 2022-03-21
Payer: COMMERCIAL

## 2022-03-21 VITALS
WEIGHT: 202 LBS | DIASTOLIC BLOOD PRESSURE: 75 MMHG | TEMPERATURE: 98.4 F | HEIGHT: 71 IN | RESPIRATION RATE: 18 BRPM | SYSTOLIC BLOOD PRESSURE: 115 MMHG | HEART RATE: 89 BPM | OXYGEN SATURATION: 96 % | BODY MASS INDEX: 28.28 KG/M2

## 2022-03-21 DIAGNOSIS — I10 ESSENTIAL HYPERTENSION: Primary | ICD-10-CM

## 2022-03-21 DIAGNOSIS — E78.00 HYPERCHOLESTEROLEMIA: ICD-10-CM

## 2022-03-21 DIAGNOSIS — Z12.11 SCREEN FOR COLON CANCER: ICD-10-CM

## 2022-03-21 DIAGNOSIS — Z12.5 SCREENING FOR PROSTATE CANCER: ICD-10-CM

## 2022-03-21 DIAGNOSIS — M05.9 RHEUMATOID ARTHRITIS WITH RHEUMATOID FACTOR, UNSPECIFIED (HCC): ICD-10-CM

## 2022-03-21 DIAGNOSIS — R73.9 BORDERLINE HYPERGLYCEMIA: ICD-10-CM

## 2022-03-21 PROCEDURE — 99214 OFFICE O/P EST MOD 30 MIN: CPT | Performed by: INTERNAL MEDICINE

## 2022-03-21 NOTE — PROGRESS NOTES
Bri Marroquin is a 63 y.o. male  Chief Complaint   Patient presents with    Follow-up     6 month     Health Maintenance Due   Topic Date Due    Depression Screen  Never done    COVID-19 Vaccine (1) Never done    Colorectal Cancer Screening Combo  Never done    DTaP/Tdap/Td series (1 - Tdap) 10/02/2008    Shingrix Vaccine Age 50> (1 of 2) Never done    Flu Vaccine (1) 09/01/2021     Visit Vitals  /75   Pulse 89   Temp 98.4 °F (36.9 °C) (Temporal)   Resp 18   Ht 5' 11\" (1.803 m)   Wt 202 lb (91.6 kg)   SpO2 96%   BMI 28.17 kg/m²     1. Have you been to the ER, urgent care clinic since your last visit? Hospitalized since your last visit? No     2. Have you seen or consulted any other health care providers outside of the [address] [place] [address] since your last visit? Include any pap smears or colon screening.  No

## 2022-03-21 NOTE — PROGRESS NOTES
SUBJECTIVE:   Mr. Faby Duarte is a 63 y.o. male who is here for follow up of routine medical issues. Previously saw Dr. Irina Yarbrough. Chief Complaint   Patient presents with    Follow-up     6 month       He had CTS surgery in 2020; still \"not 100%\" on the left, and occasionally on the right. He has a history of positive rheumatoid factor--negative on recheck, along with negative CCP. He is starting to have a little joint pain and fatigue. +Morning stiffness lasting 45-60 min. As we noted in 2014: He recalls that when he was started on losartan, he developed a lot of joint pains, fatigue, and loss of exercise tolerance. \"I stopped taking it and the aches would subside. \" \"Now I'm not quite back to where I was before, but pretty near. \"   Allergies currently quiescent. At this time, he is otherwise doing well and has brought no other complaints to my attention today. For a list of the medical issues addressed today, see the assessment and plan below. PMH:   Past Medical History:   Diagnosis Date    Hx of seasonal allergies     Hypercholesterolemia 10/14/2013    Hypertension        Past Surgical History:   Procedure Laterality Date    HX CARPAL TUNNEL RELEASE  09/2020    L & R hand     HX ORTHOPAEDIC  1989    Back Surgery     HX WISDOM TEETH EXTRACTION         All: He is allergic to coricidin [phenylephrine hcl] and losartan. Current Outpatient Medications   Medication Sig    amLODIPine (NORVASC) 10 mg tablet TAKE ONE TABLET BY MOUTH ONE TIME DAILY    docosahexaenoic acid/epa (FISH OIL PO) Take  by mouth.  OTHER Tumeric po    CETIRIZINE HCL (ZYRTEC PO) Take  by mouth.  multivitamin (ONE A DAY) tablet Take 1 Tab by mouth daily.  fluticasone (FLOVENT HFA) 220 mcg/actuation inhaler Take 2 Puffs by inhalation two (2) times a day.  (Patient not taking: Reported on 3/21/2022)    guaiFENesin-codeine (ROBAFEN AC) 100-10 mg/5 mL solution Take 5 mL by mouth three (3) times daily as needed for Cough. Max Daily Amount: 15 mL. (Patient not taking: Reported on 3/21/2022)     No current facility-administered medications for this visit. FH: His family history includes Cancer in his father; Hypertension in his mother. 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 SH: He is an  with [address] Travels a lot. He  reports that he has never smoked. He has never used smokeless tobacco. He reports current alcohol use of about 1.7 standard drinks of alcohol per week. He reports that he does not use drugs. ROS: See above; Complete ROS otherwise negative. OBJECTIVE:   Vitals:   Visit Vitals  /75   Pulse 89   Temp 98.4 °F (36.9 °C) (Temporal)   Resp 18   Ht 5' 11\" (1.803 m)   Wt 202 lb (91.6 kg)   SpO2 96%   BMI 28.17 kg/m²      Gen: Pleasant 63 y.o.  male in NAD. HEENT: PERRLA. EOMI. OP moist and pink. Neck: Supple. No LAD. HEART: RRR, No M/G/R.    LUNGS: CTAB No W/R. ABDOMEN: S, NT, ND, BS+. EXTREMITIES: Warm. No C/C/E.  MUSCULOSKELETAL: Normal ROM, muscle strength 5/5 all groups. NEURO: Alert and oriented x 3. Cranial nerves grossly intact. No focal sensory or motor deficits noted. SKIN: Warm. Dry. No rashes or other lesions noted. Lab Results   Component Value Date/Time    Sodium 142 02/23/2021 10:47 AM    Potassium 4.6 02/23/2021 10:47 AM    Chloride 104 02/23/2021 10:47 AM    CO2 25 02/23/2021 10:47 AM    Glucose 104 (H) 02/23/2021 10:47 AM    BUN 13 02/23/2021 10:47 AM    Creatinine 1.04 02/23/2021 10:47 AM    BUN/Creatinine ratio 13 02/23/2021 10:47 AM    GFR est AA 91 02/23/2021 10:47 AM    GFR est non-AA 79 02/23/2021 10:47 AM    Calcium 9.1 02/23/2021 10:47 AM    Bilirubin, total 0.4 02/23/2021 10:47 AM    ALT (SGPT) 19 02/23/2021 10:47 AM    Alk.  phosphatase 69 02/23/2021 10:47 AM    Protein, total 6.7 02/23/2021 10:47 AM    Albumin 4.3 02/23/2021 10:47 AM    A-G Ratio 1.8 02/23/2021 10:47 AM       Lab Results   Component Value Date/Time    Cholesterol, total 184 02/23/2021 10:47 AM    HDL Cholesterol 51 02/23/2021 10:47 AM    LDL, calculated 113 (H) 02/23/2021 10:47 AM    LDL, calculated 117 (H) 10/01/2019 08:56 AM    Triglyceride 114 02/23/2021 10:47 AM        Lab Results   Component Value Date/Time    Hemoglobin A1c 5.7 (H) 02/23/2021 10:47 AM       Lab Results   Component Value Date/Time    WBC 5.0 02/23/2021 10:47 AM    HGB 17.7 02/23/2021 10:47 AM    HCT 51.6 (H) 02/23/2021 10:47 AM    PLATELET 438 01/75/5659 10:47 AM    MCV 90 02/23/2021 10:47 AM         ASSESSMENT/ PLAN: Camilla Boyle was seen today for follow up. 1. HTN (hypertension): Fine today. - METABOLIC PANEL, COMPREHENSIVE  - CBC WITH AUTOMATED DIFF  2. Hypercholesterolemia: Recheck. 3. Arthritis: Doing better. (Arthritis multiple joints + Morning stiffness. RA and CCP negative. Prev referred to Dr. Yon Guillaume; he never went). 4. Allergic rhinitis: Improved. Zyrtec. 5. Overweight: Continue work on diet and exericse. 6. Borderline hyperglycemia: Check A1C.   7. Colon Ca screening; referred to Dr. Vivek Sims. I have reviewed the patient's medications and risks/side effects/benefits were discussed. Diagnosis(-es) explained to patient and questions answered. Literature provided where appropriate.

## 2022-03-22 LAB
ALBUMIN SERPL-MCNC: 3.8 G/DL (ref 3.5–5)
ALBUMIN/GLOB SERPL: 1.4 {RATIO} (ref 1.1–2.2)
ALP SERPL-CCNC: 68 U/L (ref 45–117)
ALT SERPL-CCNC: 30 U/L (ref 12–78)
ANION GAP SERPL CALC-SCNC: 6 MMOL/L (ref 5–15)
AST SERPL-CCNC: 23 U/L (ref 15–37)
BASOPHILS # BLD: 0.1 K/UL (ref 0–0.1)
BASOPHILS NFR BLD: 1 % (ref 0–1)
BILIRUB SERPL-MCNC: 0.4 MG/DL (ref 0.2–1)
BUN SERPL-MCNC: 16 MG/DL (ref 6–20)
BUN/CREAT SERPL: 12 (ref 12–20)
CALCIUM SERPL-MCNC: 8.8 MG/DL (ref 8.5–10.1)
CHLORIDE SERPL-SCNC: 107 MMOL/L (ref 97–108)
CHOLEST SERPL-MCNC: 194 MG/DL
CO2 SERPL-SCNC: 26 MMOL/L (ref 21–32)
CREAT SERPL-MCNC: 1.33 MG/DL (ref 0.7–1.3)
DIFFERENTIAL METHOD BLD: NORMAL
EOSINOPHIL # BLD: 0.2 K/UL (ref 0–0.4)
EOSINOPHIL NFR BLD: 4 % (ref 0–7)
ERYTHROCYTE [DISTWIDTH] IN BLOOD BY AUTOMATED COUNT: 13.4 % (ref 11.5–14.5)
EST. AVERAGE GLUCOSE BLD GHB EST-MCNC: 114 MG/DL
GLOBULIN SER CALC-MCNC: 2.8 G/DL (ref 2–4)
GLUCOSE SERPL-MCNC: 127 MG/DL (ref 65–100)
HBA1C MFR BLD: 5.6 % (ref 4–5.6)
HCT VFR BLD AUTO: 46.9 % (ref 36.6–50.3)
HDLC SERPL-MCNC: 47 MG/DL
HDLC SERPL: 4.1 {RATIO} (ref 0–5)
HGB BLD-MCNC: 15.7 G/DL (ref 12.1–17)
IMM GRANULOCYTES # BLD AUTO: 0 K/UL (ref 0–0.04)
IMM GRANULOCYTES NFR BLD AUTO: 0 % (ref 0–0.5)
LDLC SERPL CALC-MCNC: 117 MG/DL (ref 0–100)
LYMPHOCYTES # BLD: 1.6 K/UL (ref 0.8–3.5)
LYMPHOCYTES NFR BLD: 26 % (ref 12–49)
MCH RBC QN AUTO: 30.6 PG (ref 26–34)
MCHC RBC AUTO-ENTMCNC: 33.5 G/DL (ref 30–36.5)
MCV RBC AUTO: 91.4 FL (ref 80–99)
MONOCYTES # BLD: 0.6 K/UL (ref 0–1)
MONOCYTES NFR BLD: 10 % (ref 5–13)
NEUTS SEG # BLD: 3.5 K/UL (ref 1.8–8)
NEUTS SEG NFR BLD: 59 % (ref 32–75)
NRBC # BLD: 0 K/UL (ref 0–0.01)
NRBC BLD-RTO: 0 PER 100 WBC
PLATELET # BLD AUTO: 212 K/UL (ref 150–400)
PMV BLD AUTO: 10.8 FL (ref 8.9–12.9)
POTASSIUM SERPL-SCNC: 3.8 MMOL/L (ref 3.5–5.1)
PROT SERPL-MCNC: 6.6 G/DL (ref 6.4–8.2)
RBC # BLD AUTO: 5.13 M/UL (ref 4.1–5.7)
SODIUM SERPL-SCNC: 139 MMOL/L (ref 136–145)
TRIGL SERPL-MCNC: 150 MG/DL (ref ?–150)
VLDLC SERPL CALC-MCNC: 30 MG/DL
WBC # BLD AUTO: 6.1 K/UL (ref 4.1–11.1)

## 2022-03-23 LAB
PSA SERPL-MCNC: 0.8 NG/ML (ref 0–4)
REFLEX CRITERIA: NORMAL

## 2022-11-09 ENCOUNTER — OFFICE VISIT (OUTPATIENT)
Dept: INTERNAL MEDICINE CLINIC | Age: 62
End: 2022-11-09
Payer: COMMERCIAL

## 2022-11-09 VITALS
OXYGEN SATURATION: 97 % | SYSTOLIC BLOOD PRESSURE: 137 MMHG | RESPIRATION RATE: 16 BRPM | BODY MASS INDEX: 29.46 KG/M2 | HEART RATE: 86 BPM | DIASTOLIC BLOOD PRESSURE: 90 MMHG | HEIGHT: 71 IN | TEMPERATURE: 98.1 F | WEIGHT: 210.4 LBS

## 2022-11-09 DIAGNOSIS — I10 ESSENTIAL HYPERTENSION: Primary | ICD-10-CM

## 2022-11-09 DIAGNOSIS — E78.00 HYPERCHOLESTEROLEMIA: ICD-10-CM

## 2022-11-09 DIAGNOSIS — R73.9 BORDERLINE HYPERGLYCEMIA: ICD-10-CM

## 2022-11-09 DIAGNOSIS — Z12.11 SCREEN FOR COLON CANCER: ICD-10-CM

## 2022-11-09 PROCEDURE — 99214 OFFICE O/P EST MOD 30 MIN: CPT | Performed by: INTERNAL MEDICINE

## 2022-11-09 RX ORDER — METFORMIN HYDROCHLORIDE 500 MG/1
500 TABLET, EXTENDED RELEASE ORAL
Qty: 30 TABLET | Refills: 11 | Status: SHIPPED | OUTPATIENT
Start: 2022-11-09 | End: 2022-11-09

## 2022-11-09 NOTE — PROGRESS NOTES
1. \"Have you been to the ER, urgent care clinic since your last visit? Hospitalized since your last visit? \" No    2. \"Have you seen or consulted any other health care providers outside of the [address] [place] [address] since your last visit? \" No     3. For patients aged 39-70: Has the patient had a colonoscopy / FIT/ Cologuard?  Referral given today

## 2022-11-09 NOTE — PROGRESS NOTES
SUBJECTIVE:   Mr. Brandi Springer is a 63 y.o. male who is here for follow up of routine medical issues. Previously saw Dr. Cuong Donnelly. Chief Complaint   Patient presents with    Follow-up       He traveled to [place] in September. He developed bronchitis on the way home, COVID (-). This has resolved. Still gets a little cough at night. HTN: He is on amlodipine. Runs 125-140/80-90 at home. He denies any cardiovascular symptoms (CP, SOB, edema). Arthritis pain is better. He still has MCP pain, \"but that's since carpal tunnel surgery. \"     He has a history of positive rheumatoid factor--negative on recheck, along with negative CCP. He is starting to have a little joint pain and fatigue. +Morning stiffness lasting 45-60 min. As we noted in 2014: He recalls that when he was started on losartan, he developed a lot of joint pains, fatigue, and loss of exercise tolerance. \"I stopped taking it and the aches would subside. \" \"Now I'm not quite back to where I was before, but pretty near. \"     Allergies currently quiescent. At this time, he is otherwise doing well and has brought no other complaints to my attention today. For a list of the medical issues addressed today, see the assessment and plan below. PMH:   Past Medical History:   Diagnosis Date    Hx of seasonal allergies     Hypercholesterolemia 10/14/2013    Hypertension        Past Surgical History:   Procedure Laterality Date    HX CARPAL TUNNEL RELEASE  09/2020    L & R hand     HX ORTHOPAEDIC  1989    Back Surgery     HX WISDOM TEETH EXTRACTION         All: He is allergic to coricidin [phenylephrine hcl] and losartan. Current Outpatient Medications   Medication Sig    amLODIPine (NORVASC) 10 mg tablet TAKE ONE TABLET BY MOUTH ONE TIME DAILY    docosahexaenoic acid/epa (FISH OIL PO) Take  by mouth. OTHER Tumeric po    CETIRIZINE HCL (ZYRTEC PO) Take  by mouth.    multivitamin (ONE A DAY) tablet Take 1 Tab by mouth daily.      No current facility-administered medications for this visit. FH: His family history includes Cancer in his father; Hypertension in his mother. 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 SH: He is an  with [address] Travels a lot. He  reports that he has never smoked. He has never used smokeless tobacco. He reports current alcohol use of about 1.7 standard drinks per week. He reports that he does not use drugs. ROS: See above; Complete ROS otherwise negative. OBJECTIVE:   Vitals:   Visit Vitals  BP (!) 137/90 (BP 1 Location: Left upper arm, BP Patient Position: Sitting, BP Cuff Size: Adult)   Pulse 86   Temp 98.1 °F (36.7 °C) (Temporal)   Resp 16   Ht 5' 11\" (1.803 m)   Wt 210 lb 6.4 oz (95.4 kg)   SpO2 97%   BMI 29.34 kg/m²      Gen: Pleasant 63 y.o.  male in NAD. HEENT: PERRLA. EOMI. OP moist and pink. Neck: Supple. No LAD. HEART: RRR, No M/G/R.    LUNGS: CTAB No W/R. ABDOMEN: S, NT, ND, BS+. EXTREMITIES: Warm. No C/C/E.  MUSCULOSKELETAL: Normal ROM, muscle strength 5/5 all groups. NEURO: Alert and oriented x 3. Cranial nerves grossly intact. No focal sensory or motor deficits noted. SKIN: Warm. Dry. No rashes or other lesions noted. Lab Results   Component Value Date/Time    Sodium 139 03/21/2022 02:49 PM    Potassium 3.8 03/21/2022 02:49 PM    Chloride 107 03/21/2022 02:49 PM    CO2 26 03/21/2022 02:49 PM    Anion gap 6 03/21/2022 02:49 PM    Glucose 127 (H) 03/21/2022 02:49 PM    BUN 16 03/21/2022 02:49 PM    Creatinine 1.33 (H) 03/21/2022 02:49 PM    BUN/Creatinine ratio 12 03/21/2022 02:49 PM    GFR est AA >60 03/21/2022 02:49 PM    GFR est non-AA 55 (L) 03/21/2022 02:49 PM    Calcium 8.8 03/21/2022 02:49 PM    Bilirubin, total 0.4 03/21/2022 02:49 PM    ALT (SGPT) 30 03/21/2022 02:49 PM    Alk.  phosphatase 68 03/21/2022 02:49 PM    Protein, total 6.6 03/21/2022 02:49 PM    Albumin 3.8 03/21/2022 02:49 PM    Globulin 2.8 03/21/2022 02:49 PM    A-G Ratio 1.4 03/21/2022 02:49 PM       Lab Results   Component Value Date/Time    Cholesterol, total 194 03/21/2022 02:49 PM    HDL Cholesterol 47 03/21/2022 02:49 PM    LDL, calculated 117 (H) 03/21/2022 02:49 PM    Triglyceride 150 (H) 03/21/2022 02:49 PM    CHOL/HDL Ratio 4.1 03/21/2022 02:49 PM        Lab Results   Component Value Date/Time    Hemoglobin A1c 5.6 03/21/2022 02:49 PM       Lab Results   Component Value Date/Time    WBC 6.1 03/21/2022 02:49 PM    HGB 15.7 03/21/2022 02:49 PM    HCT 46.9 03/21/2022 02:49 PM    PLATELET 595 43/42/4938 02:49 PM    MCV 91.4 03/21/2022 02:49 PM         ASSESSMENT/ PLAN: Migdalia Nguyen was seen today for follow up. HTN (hypertension): Borderline today. OK previously. No change. - METABOLIC PANEL, COMPREHENSIVE  - CBC WITH AUTOMATED DIFF  Hypercholesterolemia: Recheck. Arthritis: Doing better. (Arthritis multiple joints + Morning stiffness. RA and CCP negative. Prev referred to Dr. Mitchell Martinez; he never went). Allergic rhinitis: Improved. Zyrtec. Overweight: Continue work on diet and exericse. Borderline hyperglycemia: Check A1C. May consider low dose metformin. Colon Ca screening; referred to Dr. Camron Avila. I have reviewed the patient's medications and risks/side effects/benefits were discussed. Diagnosis(-es) explained to patient and questions answered. Literature provided where appropriate.       RTC 6 months, HTN

## 2022-11-28 ENCOUNTER — APPOINTMENT (OUTPATIENT)
Dept: INTERNAL MEDICINE CLINIC | Age: 62
End: 2022-11-28

## 2022-11-28 DIAGNOSIS — I10 ESSENTIAL HYPERTENSION: ICD-10-CM

## 2022-11-28 DIAGNOSIS — R73.9 BORDERLINE HYPERGLYCEMIA: ICD-10-CM

## 2022-11-28 DIAGNOSIS — E78.00 HYPERCHOLESTEROLEMIA: ICD-10-CM

## 2022-11-29 LAB
ALBUMIN SERPL-MCNC: 3.7 G/DL (ref 3.5–5)
ALBUMIN/GLOB SERPL: 1.2 {RATIO} (ref 1.1–2.2)
ALP SERPL-CCNC: 60 U/L (ref 45–117)
ALT SERPL-CCNC: 33 U/L (ref 12–78)
ANION GAP SERPL CALC-SCNC: 7 MMOL/L (ref 5–15)
AST SERPL-CCNC: 18 U/L (ref 15–37)
BASOPHILS # BLD: 0.1 K/UL (ref 0–0.1)
BASOPHILS NFR BLD: 1 % (ref 0–1)
BILIRUB SERPL-MCNC: 0.6 MG/DL (ref 0.2–1)
BUN SERPL-MCNC: 17 MG/DL (ref 6–20)
BUN/CREAT SERPL: 12 (ref 12–20)
CALCIUM SERPL-MCNC: 8.5 MG/DL (ref 8.5–10.1)
CHLORIDE SERPL-SCNC: 108 MMOL/L (ref 97–108)
CHOLEST SERPL-MCNC: 198 MG/DL
CO2 SERPL-SCNC: 25 MMOL/L (ref 21–32)
CREAT SERPL-MCNC: 1.39 MG/DL (ref 0.7–1.3)
DIFFERENTIAL METHOD BLD: ABNORMAL
EOSINOPHIL # BLD: 0.3 K/UL (ref 0–0.4)
EOSINOPHIL NFR BLD: 6 % (ref 0–7)
ERYTHROCYTE [DISTWIDTH] IN BLOOD BY AUTOMATED COUNT: 13.4 % (ref 11.5–14.5)
EST. AVERAGE GLUCOSE BLD GHB EST-MCNC: 114 MG/DL
GLOBULIN SER CALC-MCNC: 3.2 G/DL (ref 2–4)
GLUCOSE SERPL-MCNC: 112 MG/DL (ref 65–100)
HBA1C MFR BLD: 5.6 % (ref 4–5.6)
HCT VFR BLD AUTO: 52.5 % (ref 36.6–50.3)
HDLC SERPL-MCNC: 52 MG/DL
HDLC SERPL: 3.8 {RATIO} (ref 0–5)
HGB BLD-MCNC: 17.6 G/DL (ref 12.1–17)
IMM GRANULOCYTES # BLD AUTO: 0 K/UL (ref 0–0.04)
IMM GRANULOCYTES NFR BLD AUTO: 0 % (ref 0–0.5)
LDLC SERPL CALC-MCNC: 123.6 MG/DL (ref 0–100)
LYMPHOCYTES # BLD: 1.3 K/UL (ref 0.8–3.5)
LYMPHOCYTES NFR BLD: 25 % (ref 12–49)
MCH RBC QN AUTO: 31.2 PG (ref 26–34)
MCHC RBC AUTO-ENTMCNC: 33.5 G/DL (ref 30–36.5)
MCV RBC AUTO: 92.9 FL (ref 80–99)
MONOCYTES # BLD: 0.6 K/UL (ref 0–1)
MONOCYTES NFR BLD: 11 % (ref 5–13)
NEUTS SEG # BLD: 2.9 K/UL (ref 1.8–8)
NEUTS SEG NFR BLD: 57 % (ref 32–75)
NRBC # BLD: 0 K/UL (ref 0–0.01)
NRBC BLD-RTO: 0 PER 100 WBC
PLATELET # BLD AUTO: 201 K/UL (ref 150–400)
PMV BLD AUTO: 11.4 FL (ref 8.9–12.9)
POTASSIUM SERPL-SCNC: 3.9 MMOL/L (ref 3.5–5.1)
PROT SERPL-MCNC: 6.9 G/DL (ref 6.4–8.2)
RBC # BLD AUTO: 5.65 M/UL (ref 4.1–5.7)
SODIUM SERPL-SCNC: 140 MMOL/L (ref 136–145)
TRIGL SERPL-MCNC: 112 MG/DL (ref ?–150)
VLDLC SERPL CALC-MCNC: 22.4 MG/DL
WBC # BLD AUTO: 5.2 K/UL (ref 4.1–11.1)

## 2023-05-22 ENCOUNTER — OFFICE VISIT (OUTPATIENT)
Age: 63
End: 2023-05-22
Payer: COMMERCIAL

## 2023-05-22 VITALS
TEMPERATURE: 97 F | WEIGHT: 211.6 LBS | BODY MASS INDEX: 29.62 KG/M2 | HEART RATE: 89 BPM | SYSTOLIC BLOOD PRESSURE: 145 MMHG | DIASTOLIC BLOOD PRESSURE: 89 MMHG | HEIGHT: 71 IN

## 2023-05-22 DIAGNOSIS — Z12.5 ENCOUNTER FOR SCREENING FOR MALIGNANT NEOPLASM OF PROSTATE: ICD-10-CM

## 2023-05-22 DIAGNOSIS — R73.9 HYPERGLYCEMIA, UNSPECIFIED: ICD-10-CM

## 2023-05-22 DIAGNOSIS — M05.9 RHEUMATOID ARTHRITIS WITH RHEUMATOID FACTOR, UNSPECIFIED (HCC): ICD-10-CM

## 2023-05-22 DIAGNOSIS — I10 ESSENTIAL (PRIMARY) HYPERTENSION: Primary | ICD-10-CM

## 2023-05-22 DIAGNOSIS — E78.00 PURE HYPERCHOLESTEROLEMIA, UNSPECIFIED: ICD-10-CM

## 2023-05-22 PROCEDURE — 3079F DIAST BP 80-89 MM HG: CPT | Performed by: INTERNAL MEDICINE

## 2023-05-22 PROCEDURE — 3077F SYST BP >= 140 MM HG: CPT | Performed by: INTERNAL MEDICINE

## 2023-05-22 PROCEDURE — 99214 OFFICE O/P EST MOD 30 MIN: CPT | Performed by: INTERNAL MEDICINE

## 2023-05-22 RX ORDER — MULTIVITAMIN
1 CAPSULE ORAL DAILY
COMMUNITY

## 2023-05-22 RX ORDER — AMLODIPINE BESYLATE 10 MG/1
1 TABLET ORAL DAILY
COMMUNITY
Start: 2020-07-13 | End: 2023-05-26 | Stop reason: SDUPTHER

## 2023-05-22 RX ORDER — CETIRIZINE HYDROCHLORIDE 10 MG/1
10 TABLET ORAL DAILY
COMMUNITY

## 2023-05-22 SDOH — ECONOMIC STABILITY: FOOD INSECURITY: WITHIN THE PAST 12 MONTHS, YOU WORRIED THAT YOUR FOOD WOULD RUN OUT BEFORE YOU GOT MONEY TO BUY MORE.: NEVER TRUE

## 2023-05-22 SDOH — ECONOMIC STABILITY: HOUSING INSECURITY
IN THE LAST 12 MONTHS, WAS THERE A TIME WHEN YOU DID NOT HAVE A STEADY PLACE TO SLEEP OR SLEPT IN A SHELTER (INCLUDING NOW)?: NO

## 2023-05-22 SDOH — ECONOMIC STABILITY: INCOME INSECURITY: HOW HARD IS IT FOR YOU TO PAY FOR THE VERY BASICS LIKE FOOD, HOUSING, MEDICAL CARE, AND HEATING?: NOT HARD AT ALL

## 2023-05-22 SDOH — ECONOMIC STABILITY: FOOD INSECURITY: WITHIN THE PAST 12 MONTHS, THE FOOD YOU BOUGHT JUST DIDN'T LAST AND YOU DIDN'T HAVE MONEY TO GET MORE.: NEVER TRUE

## 2023-05-22 ASSESSMENT — PATIENT HEALTH QUESTIONNAIRE - PHQ9
SUM OF ALL RESPONSES TO PHQ QUESTIONS 1-9: 0
SUM OF ALL RESPONSES TO PHQ9 QUESTIONS 1 & 2: 0
1. LITTLE INTEREST OR PLEASURE IN DOING THINGS: 0
SUM OF ALL RESPONSES TO PHQ QUESTIONS 1-9: 0
SUM OF ALL RESPONSES TO PHQ QUESTIONS 1-9: 0
2. FEELING DOWN, DEPRESSED OR HOPELESS: 0
SUM OF ALL RESPONSES TO PHQ QUESTIONS 1-9: 0

## 2023-05-22 ASSESSMENT — ANXIETY QUESTIONNAIRES
7. FEELING AFRAID AS IF SOMETHING AWFUL MIGHT HAPPEN: 0
1. FEELING NERVOUS, ANXIOUS, OR ON EDGE: 0
6. BECOMING EASILY ANNOYED OR IRRITABLE: 0
IF YOU CHECKED OFF ANY PROBLEMS ON THIS QUESTIONNAIRE, HOW DIFFICULT HAVE THESE PROBLEMS MADE IT FOR YOU TO DO YOUR WORK, TAKE CARE OF THINGS AT HOME, OR GET ALONG WITH OTHER PEOPLE: NOT DIFFICULT AT ALL
GAD7 TOTAL SCORE: 0
2. NOT BEING ABLE TO STOP OR CONTROL WORRYING: 0
4. TROUBLE RELAXING: 0
3. WORRYING TOO MUCH ABOUT DIFFERENT THINGS: 0
5. BEING SO RESTLESS THAT IT IS HARD TO SIT STILL: 0

## 2023-05-22 NOTE — PROGRESS NOTES
SUBJECTIVE:   Mr. Loretta Rubalcava is a 63 y.o. male who is here for follow up of routine medical issues. Previously saw Dr. Marylin Nichols. Chief Complaint   Patient presents with    Follow-up     6 month fu     Allergies have flared lately. He has nasal congestion, runny nose. HTN: He is on amlodipine. Runs 125-140/80-90 at home. He denies any cardiovascular symptoms (CP, SOB, edema). Arthritis pain is stable. He still has MCP pain, \"but it's better. \"     He has a history of positive rheumatoid factor--negative on recheck, along with negative CCP. He is starting to have a little joint pain and fatigue. +Morning stiffness lasting 45-60 min. As we noted in 2014: He recalls that when he was started on losartan, he developed a lot of joint pains, fatigue, and loss of exercise tolerance. \"I stopped taking it and the aches would subside. \" \"Now I'm not quite back to where I was before, but pretty near. \"       At this time, he is otherwise doing well and has brought no other complaints to my attention today. For a list of the medical issues addressed today, see the assessment and plan below. PMH:   Past Medical History:   Diagnosis Date    Hx of seasonal allergies     Hypercholesterolemia 10/14/2013    Hypertension      Past Surgical History:   Procedure Laterality Date    CARPAL TUNNEL RELEASE  09/2020    L & R hand     ORTHOPEDIC SURGERY  1989    Back Surgery     WISDOM TOOTH EXTRACTION         All: He has No Known Allergies. Current Outpatient Medications on File Prior to Visit   Medication Sig Dispense Refill    amLODIPine (NORVASC) 10 MG tablet Take 1 tablet by mouth daily      cetirizine (ZYRTEC) 10 MG tablet Take 1 tablet by mouth daily      Multiple Vitamin (MULTIVITAMIN) capsule Take 1 capsule by mouth daily       No current facility-administered medications on file prior to visit. FH: His family history includes Cancer in his father; Hypertension in his mother.   Father had lung

## 2023-05-22 NOTE — PROGRESS NOTES
1. \"Have you been to the ER, urgent care clinic since your last visit? Hospitalized since your last visit? \" No    2. \"Have you seen or consulted any other health care providers outside of the [address] [place] [address] since your last visit? \" No     3. For patients aged 39-70: Has the patient had a colonoscopy / FIT/ Cologuard?   Appointment scheduled  6/2023

## 2023-05-25 NOTE — TELEPHONE ENCOUNTER
PCP: Aye Pedro MD    Last appt:   5/22/2023    Future Appointments   Date Time Provider Rocael Jaimes   11/20/2023  8:45 AM Milan Silveira MD [facility] BS AMB       Requested Prescriptions     Pending Prescriptions Disp Refills    amLODIPine (NORVASC) 10 MG tablet 90 tablet 1     Sig: Take 1 tablet by mouth daily

## 2023-05-25 NOTE — TELEPHONE ENCOUNTER
Caller requests Refill of:   amLODIPine (NORVASC) 10 MG tablet       Please send to:  [facility], [address] [place] [address]  [phone] [facility] [phone]          Visit Appointment History:  Future Appointments:  Future Appointments   Date Time Provider Rocael Jaimes   11/20/2023  8:45 AM Xander Dickson MD [facility] BS AMB         Last Appointment With Me:  5/22/2023         Caller confirmed instructions and dosages as correct. Caller was advised that Meds will be refilled as soon as possible, however there can be a 48-72 business hour turn around on refill requests.

## 2023-05-26 RX ORDER — AMLODIPINE BESYLATE 10 MG/1
10 TABLET ORAL DAILY
Qty: 90 TABLET | Refills: 1 | Status: SHIPPED | OUTPATIENT
Start: 2023-05-26

## 2023-08-24 ENCOUNTER — NURSE ONLY (OUTPATIENT)
Age: 63
End: 2023-08-24

## 2023-08-24 DIAGNOSIS — I10 ESSENTIAL (PRIMARY) HYPERTENSION: ICD-10-CM

## 2023-08-24 DIAGNOSIS — R73.9 HYPERGLYCEMIA, UNSPECIFIED: ICD-10-CM

## 2023-08-24 DIAGNOSIS — E78.00 PURE HYPERCHOLESTEROLEMIA, UNSPECIFIED: ICD-10-CM

## 2023-08-24 DIAGNOSIS — Z12.5 ENCOUNTER FOR SCREENING FOR MALIGNANT NEOPLASM OF PROSTATE: ICD-10-CM

## 2023-08-24 LAB
ALBUMIN SERPL-MCNC: 3.7 G/DL (ref 3.5–5)
ALBUMIN/GLOB SERPL: 1.2 (ref 1.1–2.2)
ALP SERPL-CCNC: 60 U/L (ref 45–117)
ALT SERPL-CCNC: 31 U/L (ref 12–78)
ANION GAP SERPL CALC-SCNC: 6 MMOL/L (ref 5–15)
AST SERPL-CCNC: 21 U/L (ref 15–37)
BASOPHILS # BLD: 0.1 K/UL (ref 0–0.1)
BASOPHILS NFR BLD: 2 % (ref 0–1)
BILIRUB SERPL-MCNC: 0.5 MG/DL (ref 0.2–1)
BUN SERPL-MCNC: 14 MG/DL (ref 6–20)
BUN/CREAT SERPL: 11 (ref 12–20)
CALCIUM SERPL-MCNC: 9 MG/DL (ref 8.5–10.1)
CHLORIDE SERPL-SCNC: 108 MMOL/L (ref 97–108)
CHOLEST SERPL-MCNC: 172 MG/DL
CO2 SERPL-SCNC: 28 MMOL/L (ref 21–32)
CREAT SERPL-MCNC: 1.24 MG/DL (ref 0.7–1.3)
DIFFERENTIAL METHOD BLD: ABNORMAL
EOSINOPHIL # BLD: 0.3 K/UL (ref 0–0.4)
EOSINOPHIL NFR BLD: 7 % (ref 0–7)
ERYTHROCYTE [DISTWIDTH] IN BLOOD BY AUTOMATED COUNT: 13.2 % (ref 11.5–14.5)
EST. AVERAGE GLUCOSE BLD GHB EST-MCNC: 123 MG/DL
GLOBULIN SER CALC-MCNC: 3.1 G/DL (ref 2–4)
GLUCOSE SERPL-MCNC: 102 MG/DL (ref 65–100)
HBA1C MFR BLD: 5.9 % (ref 4–5.6)
HCT VFR BLD AUTO: 50.3 % (ref 36.6–50.3)
HDLC SERPL-MCNC: 50 MG/DL
HDLC SERPL: 3.4 (ref 0–5)
HGB BLD-MCNC: 16.6 G/DL (ref 12.1–17)
IMM GRANULOCYTES # BLD AUTO: 0 K/UL (ref 0–0.04)
IMM GRANULOCYTES NFR BLD AUTO: 0 % (ref 0–0.5)
LDLC SERPL CALC-MCNC: 105 MG/DL (ref 0–100)
LYMPHOCYTES # BLD: 1.1 K/UL (ref 0.8–3.5)
LYMPHOCYTES NFR BLD: 26 % (ref 12–49)
MCH RBC QN AUTO: 30.3 PG (ref 26–34)
MCHC RBC AUTO-ENTMCNC: 33 G/DL (ref 30–36.5)
MCV RBC AUTO: 92 FL (ref 80–99)
MONOCYTES # BLD: 0.5 K/UL (ref 0–1)
MONOCYTES NFR BLD: 12 % (ref 5–13)
NEUTS SEG # BLD: 2.3 K/UL (ref 1.8–8)
NEUTS SEG NFR BLD: 53 % (ref 32–75)
NRBC # BLD: 0 K/UL (ref 0–0.01)
NRBC BLD-RTO: 0 PER 100 WBC
PLATELET # BLD AUTO: 193 K/UL (ref 150–400)
PMV BLD AUTO: 11.3 FL (ref 8.9–12.9)
POTASSIUM SERPL-SCNC: 4.2 MMOL/L (ref 3.5–5.1)
PROT SERPL-MCNC: 6.8 G/DL (ref 6.4–8.2)
RBC # BLD AUTO: 5.47 M/UL (ref 4.1–5.7)
SODIUM SERPL-SCNC: 142 MMOL/L (ref 136–145)
TRIGL SERPL-MCNC: 85 MG/DL
VLDLC SERPL CALC-MCNC: 17 MG/DL
WBC # BLD AUTO: 4.4 K/UL (ref 4.1–11.1)

## 2023-08-26 LAB
PSA FREE MFR SERPL: 51.3 %
PSA FREE SERPL-MCNC: 0.41 NG/ML
PSA SERPL-MCNC: 0.8 NG/ML (ref 0–4)

## 2023-11-20 ENCOUNTER — OFFICE VISIT (OUTPATIENT)
Age: 63
End: 2023-11-20
Payer: COMMERCIAL

## 2023-11-20 VITALS
SYSTOLIC BLOOD PRESSURE: 125 MMHG | BODY MASS INDEX: 28.78 KG/M2 | HEART RATE: 81 BPM | HEIGHT: 71 IN | DIASTOLIC BLOOD PRESSURE: 85 MMHG | TEMPERATURE: 97.5 F | OXYGEN SATURATION: 98 % | WEIGHT: 205.6 LBS | RESPIRATION RATE: 16 BRPM

## 2023-11-20 DIAGNOSIS — M05.9 RHEUMATOID ARTHRITIS WITH RHEUMATOID FACTOR, UNSPECIFIED (HCC): ICD-10-CM

## 2023-11-20 DIAGNOSIS — R73.9 HYPERGLYCEMIA, UNSPECIFIED: ICD-10-CM

## 2023-11-20 DIAGNOSIS — I10 PRIMARY HYPERTENSION: Primary | ICD-10-CM

## 2023-11-20 DIAGNOSIS — E78.00 PURE HYPERCHOLESTEROLEMIA, UNSPECIFIED: ICD-10-CM

## 2023-11-20 PROCEDURE — 3079F DIAST BP 80-89 MM HG: CPT | Performed by: INTERNAL MEDICINE

## 2023-11-20 PROCEDURE — 3074F SYST BP LT 130 MM HG: CPT | Performed by: INTERNAL MEDICINE

## 2023-11-20 PROCEDURE — 99214 OFFICE O/P EST MOD 30 MIN: CPT | Performed by: INTERNAL MEDICINE

## 2023-11-20 RX ORDER — UBIQUINOL 100 MG
CAPSULE ORAL
COMMUNITY

## 2023-11-20 NOTE — PROGRESS NOTES
SUBJECTIVE:   Mr. Niurka Oliva is a 66 y.o. male who is here for follow up of routine medical issues. Chief Complaint   Patient presents with    Follow-up     HTN       He was in [place], ascending stairs when his R knee locked up. Then this subsided, but after that he felt/heard popping and grinding with extension and flexion. He has seen orthopedist, Dr. Leslie Rai, and told he likely has meniscus injury as well as arthritis; For now, conservative management was advised. Allergies have flared lately. He has nasal congestion, runny nose. HTN: He is on amlodipine. Runs 125-140/80-90 at home. He denies any cardiovascular symptoms (CP, SOB, edema). Arthritis pain is stable. He still has MCP pain, \"but it's better. \"     He has a history of positive rheumatoid factor--negative on recheck, along with negative CCP. We have documented in 2023 that he developed a little joint pain and fatigue. +Morning stiffness lasting 45-60 min. As we noted in 2014: He recalls that when he was started on losartan, he developed a lot of joint pains, fatigue, and loss of exercise tolerance. \"I stopped taking it and the aches would subside. \" \"Now I'm not quite back to where I was before, but pretty near. \"       At this time, he is otherwise doing well and has brought no other complaints to my attention today. For a list of the medical issues addressed today, see the assessment and plan below. PMH:   Past Medical History:   Diagnosis Date    Hx of seasonal allergies     Hypercholesterolemia 10/14/2013    Hypertension      Past Surgical History:   Procedure Laterality Date    CARPAL TUNNEL RELEASE  09/2020    L & R hand     ORTHOPEDIC SURGERY  1989    Back Surgery     WISDOM TOOTH EXTRACTION         All: He is allergic to chlorphen-pe-acetaminophen, losartan, and phenylephrine hcl (pressors).    Current Outpatient Medications on File Prior to Visit   Medication Sig Dispense Refill    TURMERIC PO Take by

## 2023-11-28 RX ORDER — AMLODIPINE BESYLATE 10 MG/1
10 TABLET ORAL DAILY
Qty: 90 TABLET | Refills: 1 | Status: SHIPPED | OUTPATIENT
Start: 2023-11-28

## 2024-06-04 RX ORDER — AMLODIPINE BESYLATE 10 MG/1
10 TABLET ORAL DAILY
Qty: 90 TABLET | Refills: 1 | Status: SHIPPED | OUTPATIENT
Start: 2024-06-04

## 2024-07-15 ENCOUNTER — OFFICE VISIT (OUTPATIENT)
Age: 64
End: 2024-07-15
Payer: COMMERCIAL

## 2024-07-15 VITALS
HEART RATE: 102 BPM | TEMPERATURE: 97.3 F | SYSTOLIC BLOOD PRESSURE: 127 MMHG | OXYGEN SATURATION: 94 % | DIASTOLIC BLOOD PRESSURE: 80 MMHG | WEIGHT: 214 LBS | BODY MASS INDEX: 29.96 KG/M2 | RESPIRATION RATE: 16 BRPM | HEIGHT: 71 IN

## 2024-07-15 DIAGNOSIS — I10 PRIMARY HYPERTENSION: Primary | ICD-10-CM

## 2024-07-15 DIAGNOSIS — E78.00 PURE HYPERCHOLESTEROLEMIA, UNSPECIFIED: ICD-10-CM

## 2024-07-15 DIAGNOSIS — R73.9 HYPERGLYCEMIA, UNSPECIFIED: ICD-10-CM

## 2024-07-15 DIAGNOSIS — M05.9 RHEUMATOID ARTHRITIS WITH RHEUMATOID FACTOR, UNSPECIFIED (HCC): ICD-10-CM

## 2024-07-15 PROCEDURE — 3074F SYST BP LT 130 MM HG: CPT | Performed by: INTERNAL MEDICINE

## 2024-07-15 PROCEDURE — 3079F DIAST BP 80-89 MM HG: CPT | Performed by: INTERNAL MEDICINE

## 2024-07-15 PROCEDURE — 99214 OFFICE O/P EST MOD 30 MIN: CPT | Performed by: INTERNAL MEDICINE

## 2024-07-15 SDOH — ECONOMIC STABILITY: FOOD INSECURITY: WITHIN THE PAST 12 MONTHS, THE FOOD YOU BOUGHT JUST DIDN'T LAST AND YOU DIDN'T HAVE MONEY TO GET MORE.: NEVER TRUE

## 2024-07-15 SDOH — ECONOMIC STABILITY: FOOD INSECURITY: WITHIN THE PAST 12 MONTHS, YOU WORRIED THAT YOUR FOOD WOULD RUN OUT BEFORE YOU GOT MONEY TO BUY MORE.: NEVER TRUE

## 2024-07-15 SDOH — ECONOMIC STABILITY: INCOME INSECURITY: HOW HARD IS IT FOR YOU TO PAY FOR THE VERY BASICS LIKE FOOD, HOUSING, MEDICAL CARE, AND HEATING?: NOT HARD AT ALL

## 2024-07-15 ASSESSMENT — PATIENT HEALTH QUESTIONNAIRE - PHQ9
1. LITTLE INTEREST OR PLEASURE IN DOING THINGS: NOT AT ALL
SUM OF ALL RESPONSES TO PHQ QUESTIONS 1-9: 0
SUM OF ALL RESPONSES TO PHQ QUESTIONS 1-9: 0
2. FEELING DOWN, DEPRESSED OR HOPELESS: NOT AT ALL
SUM OF ALL RESPONSES TO PHQ QUESTIONS 1-9: 0
SUM OF ALL RESPONSES TO PHQ9 QUESTIONS 1 & 2: 0
SUM OF ALL RESPONSES TO PHQ QUESTIONS 1-9: 0

## 2024-07-15 NOTE — PROGRESS NOTES
\"Have you been to the ER, urgent care clinic since your last visit?  Hospitalized since your last visit?\"    NO    “Have you seen or consulted any other health care providers outside of [facility] since your last visit?”    NO            Click Here for Release of Records Request      
PLAN: Zain was seen today for follow up.     HTN (hypertension): Fine today. No change.   - METABOLIC PANEL, COMPREHENSIVE  - CBC WITH AUTOMATED DIFF  Hypercholesterolemia: Recheck.   Arthritis: Doing better. (Arthritis multiple joints + Morning stiffness. RA and CCP negative.  Prev referred to Dr. Guthrie; rheumatologist; he never went).   Knee injury/pain: Per orthopedist.   Allergic rhinitis: Improved. Zyrtec.   Overweight: Continue work on diet and exericse.   Borderline hyperglycemia: Check A1C. May consider low dose metformin.     I have reviewed the patient's medications and risks/side effects/benefits were discussed. Diagnosis(-es) explained to patient and questions answered. Literature provided where appropriate.      RTC 6 months, HTN

## 2024-12-04 RX ORDER — AMLODIPINE BESYLATE 10 MG/1
10 TABLET ORAL DAILY
Qty: 90 TABLET | Refills: 1 | Status: SHIPPED | OUTPATIENT
Start: 2024-12-04

## 2025-01-17 SDOH — ECONOMIC STABILITY: INCOME INSECURITY: IN THE LAST 12 MONTHS, WAS THERE A TIME WHEN YOU WERE NOT ABLE TO PAY THE MORTGAGE OR RENT ON TIME?: NO

## 2025-01-17 SDOH — ECONOMIC STABILITY: FOOD INSECURITY: WITHIN THE PAST 12 MONTHS, YOU WORRIED THAT YOUR FOOD WOULD RUN OUT BEFORE YOU GOT MONEY TO BUY MORE.: NEVER TRUE

## 2025-01-17 SDOH — ECONOMIC STABILITY: TRANSPORTATION INSECURITY
IN THE PAST 12 MONTHS, HAS LACK OF TRANSPORTATION KEPT YOU FROM MEETINGS, WORK, OR FROM GETTING THINGS NEEDED FOR DAILY LIVING?: NO

## 2025-01-17 SDOH — ECONOMIC STABILITY: TRANSPORTATION INSECURITY
IN THE PAST 12 MONTHS, HAS THE LACK OF TRANSPORTATION KEPT YOU FROM MEDICAL APPOINTMENTS OR FROM GETTING MEDICATIONS?: NO

## 2025-01-17 SDOH — ECONOMIC STABILITY: FOOD INSECURITY: WITHIN THE PAST 12 MONTHS, THE FOOD YOU BOUGHT JUST DIDN'T LAST AND YOU DIDN'T HAVE MONEY TO GET MORE.: NEVER TRUE

## 2025-01-20 ENCOUNTER — OFFICE VISIT (OUTPATIENT)
Age: 65
End: 2025-01-20
Payer: COMMERCIAL

## 2025-01-20 VITALS
BODY MASS INDEX: 29.37 KG/M2 | WEIGHT: 209.8 LBS | SYSTOLIC BLOOD PRESSURE: 127 MMHG | DIASTOLIC BLOOD PRESSURE: 80 MMHG | OXYGEN SATURATION: 96 % | HEART RATE: 88 BPM | TEMPERATURE: 97.6 F | HEIGHT: 71 IN | RESPIRATION RATE: 16 BRPM

## 2025-01-20 DIAGNOSIS — M05.9 RHEUMATOID ARTHRITIS WITH RHEUMATOID FACTOR, UNSPECIFIED (HCC): ICD-10-CM

## 2025-01-20 DIAGNOSIS — E78.00 PURE HYPERCHOLESTEROLEMIA, UNSPECIFIED: ICD-10-CM

## 2025-01-20 DIAGNOSIS — I10 PRIMARY HYPERTENSION: Primary | ICD-10-CM

## 2025-01-20 DIAGNOSIS — R73.9 HYPERGLYCEMIA, UNSPECIFIED: ICD-10-CM

## 2025-01-20 DIAGNOSIS — Z12.5 ENCOUNTER FOR SCREENING FOR MALIGNANT NEOPLASM OF PROSTATE: ICD-10-CM

## 2025-01-20 PROCEDURE — 99214 OFFICE O/P EST MOD 30 MIN: CPT | Performed by: INTERNAL MEDICINE

## 2025-01-20 PROCEDURE — 3079F DIAST BP 80-89 MM HG: CPT | Performed by: INTERNAL MEDICINE

## 2025-01-20 PROCEDURE — 3074F SYST BP LT 130 MM HG: CPT | Performed by: INTERNAL MEDICINE

## 2025-01-20 RX ORDER — AMLODIPINE BESYLATE 10 MG/1
10 TABLET ORAL DAILY
Qty: 90 TABLET | Refills: 3 | Status: SHIPPED | OUTPATIENT
Start: 2025-01-20

## 2025-01-20 SDOH — ECONOMIC STABILITY: FOOD INSECURITY: WITHIN THE PAST 12 MONTHS, YOU WORRIED THAT YOUR FOOD WOULD RUN OUT BEFORE YOU GOT MONEY TO BUY MORE.: NEVER TRUE

## 2025-01-20 SDOH — ECONOMIC STABILITY: FOOD INSECURITY: WITHIN THE PAST 12 MONTHS, THE FOOD YOU BOUGHT JUST DIDN'T LAST AND YOU DIDN'T HAVE MONEY TO GET MORE.: NEVER TRUE

## 2025-01-20 ASSESSMENT — PATIENT HEALTH QUESTIONNAIRE - PHQ9
SUM OF ALL RESPONSES TO PHQ QUESTIONS 1-9: 0
SUM OF ALL RESPONSES TO PHQ9 QUESTIONS 1 & 2: 0
1. LITTLE INTEREST OR PLEASURE IN DOING THINGS: NOT AT ALL
2. FEELING DOWN, DEPRESSED OR HOPELESS: NOT AT ALL

## 2025-01-20 NOTE — PROGRESS NOTES
AUTOMATED DIFF  Hypercholesterolemia: Recheck.   Arthritis: Doing better. (Arthritis multiple joints + Morning stiffness. RA and CCP negative.  Prev referred to Dr. Guthrie; rheumatologist; he never went).   Knee injury/pain: Per orthopedist.   Allergic rhinitis: Improved. Zyrtec.   Overweight: Continue work on diet and exericse.   Borderline hyperglycemia: Check A1C. May consider low dose metformin.     I have reviewed the patient's medications and risks/side effects/benefits were discussed. Diagnosis(-es) explained to patient and questions answered. Literature provided where appropriate.      RTC 6 months, HTN

## 2025-05-23 DIAGNOSIS — R73.9 HYPERGLYCEMIA, UNSPECIFIED: ICD-10-CM

## 2025-05-23 DIAGNOSIS — I10 PRIMARY HYPERTENSION: ICD-10-CM

## 2025-05-23 DIAGNOSIS — Z12.5 ENCOUNTER FOR SCREENING FOR MALIGNANT NEOPLASM OF PROSTATE: ICD-10-CM

## 2025-05-23 DIAGNOSIS — E78.00 PURE HYPERCHOLESTEROLEMIA, UNSPECIFIED: ICD-10-CM

## 2025-05-23 DIAGNOSIS — M05.9 RHEUMATOID ARTHRITIS WITH RHEUMATOID FACTOR, UNSPECIFIED (HCC): ICD-10-CM

## 2025-05-23 LAB
ALBUMIN SERPL-MCNC: 3.7 G/DL (ref 3.5–5)
ALBUMIN/GLOB SERPL: 1.1 (ref 1.1–2.2)
ALP SERPL-CCNC: 72 U/L (ref 45–117)
ALT SERPL-CCNC: 28 U/L (ref 12–78)
ANION GAP SERPL CALC-SCNC: 8 MMOL/L (ref 2–12)
AST SERPL-CCNC: 23 U/L (ref 15–37)
BASOPHILS # BLD: 0.08 K/UL (ref 0–0.1)
BASOPHILS NFR BLD: 1.4 % (ref 0–1)
BILIRUB SERPL-MCNC: 0.6 MG/DL (ref 0.2–1)
BUN SERPL-MCNC: 15 MG/DL (ref 6–20)
BUN/CREAT SERPL: 11 (ref 12–20)
CALCIUM SERPL-MCNC: 8.7 MG/DL (ref 8.5–10.1)
CHLORIDE SERPL-SCNC: 107 MMOL/L (ref 97–108)
CHOLEST SERPL-MCNC: 176 MG/DL
CO2 SERPL-SCNC: 27 MMOL/L (ref 21–32)
CREAT SERPL-MCNC: 1.31 MG/DL (ref 0.7–1.3)
DIFFERENTIAL METHOD BLD: ABNORMAL
EOSINOPHIL # BLD: 0.36 K/UL (ref 0–0.4)
EOSINOPHIL NFR BLD: 6.4 % (ref 0–7)
ERYTHROCYTE [DISTWIDTH] IN BLOOD BY AUTOMATED COUNT: 13.3 % (ref 11.5–14.5)
ERYTHROCYTE [SEDIMENTATION RATE] IN BLOOD: 3 MM/HR (ref 0–20)
EST. AVERAGE GLUCOSE BLD GHB EST-MCNC: 117 MG/DL
GLOBULIN SER CALC-MCNC: 3.5 G/DL (ref 2–4)
GLUCOSE SERPL-MCNC: 109 MG/DL (ref 65–100)
HBA1C MFR BLD: 5.7 % (ref 4–5.6)
HCT VFR BLD AUTO: 52.2 % (ref 36.6–50.3)
HDLC SERPL-MCNC: 51 MG/DL
HDLC SERPL: 3.5 (ref 0–5)
HGB BLD-MCNC: 17 G/DL (ref 12.1–17)
IMM GRANULOCYTES # BLD AUTO: 0.01 K/UL (ref 0–0.04)
IMM GRANULOCYTES NFR BLD AUTO: 0.2 % (ref 0–0.5)
LDLC SERPL CALC-MCNC: 103.2 MG/DL (ref 0–100)
LYMPHOCYTES # BLD: 1.41 K/UL (ref 0.8–3.5)
LYMPHOCYTES NFR BLD: 25 % (ref 12–49)
MCH RBC QN AUTO: 30.6 PG (ref 26–34)
MCHC RBC AUTO-ENTMCNC: 32.6 G/DL (ref 30–36.5)
MCV RBC AUTO: 93.9 FL (ref 80–99)
MONOCYTES # BLD: 0.71 K/UL (ref 0–1)
MONOCYTES NFR BLD: 12.6 % (ref 5–13)
NEUTS SEG # BLD: 3.06 K/UL (ref 1.8–8)
NEUTS SEG NFR BLD: 54.4 % (ref 32–75)
NRBC # BLD: 0 K/UL (ref 0–0.01)
NRBC BLD-RTO: 0 PER 100 WBC
PLATELET # BLD AUTO: 199 K/UL (ref 150–400)
PMV BLD AUTO: 11.5 FL (ref 8.9–12.9)
POTASSIUM SERPL-SCNC: 4.6 MMOL/L (ref 3.5–5.1)
PROT SERPL-MCNC: 7.2 G/DL (ref 6.4–8.2)
RBC # BLD AUTO: 5.56 M/UL (ref 4.1–5.7)
SODIUM SERPL-SCNC: 142 MMOL/L (ref 136–145)
TRIGL SERPL-MCNC: 109 MG/DL
VLDLC SERPL CALC-MCNC: 21.8 MG/DL
WBC # BLD AUTO: 5.6 K/UL (ref 4.1–11.1)

## 2025-05-25 LAB
PSA FREE MFR SERPL: 43.8 %
PSA FREE SERPL-MCNC: 0.35 NG/ML
PSA SERPL-MCNC: 0.8 NG/ML (ref 0–4)

## 2025-05-28 ENCOUNTER — RESULTS FOLLOW-UP (OUTPATIENT)
Age: 65
End: 2025-05-28

## 2025-05-28 LAB
CCP IGA+IGG SERPL IA-ACNC: 9 UNITS (ref 0–19)
RHEUMATOID FACT SERPL-ACNC: 10.7 IU/ML

## 2025-08-18 ENCOUNTER — OFFICE VISIT (OUTPATIENT)
Age: 65
End: 2025-08-18
Payer: COMMERCIAL

## 2025-08-18 VITALS
BODY MASS INDEX: 30.07 KG/M2 | HEIGHT: 71 IN | OXYGEN SATURATION: 93 % | TEMPERATURE: 97.4 F | RESPIRATION RATE: 16 BRPM | WEIGHT: 214.8 LBS | DIASTOLIC BLOOD PRESSURE: 98 MMHG | HEART RATE: 71 BPM | SYSTOLIC BLOOD PRESSURE: 122 MMHG

## 2025-08-18 DIAGNOSIS — I10 PRIMARY HYPERTENSION: ICD-10-CM

## 2025-08-18 DIAGNOSIS — E78.00 PURE HYPERCHOLESTEROLEMIA, UNSPECIFIED: ICD-10-CM

## 2025-08-18 DIAGNOSIS — R73.9 HYPERGLYCEMIA, UNSPECIFIED: ICD-10-CM

## 2025-08-18 DIAGNOSIS — R05.3 CHRONIC COUGH: Primary | ICD-10-CM

## 2025-08-18 DIAGNOSIS — M05.9 RHEUMATOID ARTHRITIS WITH RHEUMATOID FACTOR, UNSPECIFIED (HCC): ICD-10-CM

## 2025-08-18 PROCEDURE — 3080F DIAST BP >= 90 MM HG: CPT | Performed by: INTERNAL MEDICINE

## 2025-08-18 PROCEDURE — 99214 OFFICE O/P EST MOD 30 MIN: CPT | Performed by: INTERNAL MEDICINE

## 2025-08-18 PROCEDURE — 3074F SYST BP LT 130 MM HG: CPT | Performed by: INTERNAL MEDICINE
